# Patient Record
Sex: MALE | Race: WHITE | Employment: OTHER | URBAN - METROPOLITAN AREA
[De-identification: names, ages, dates, MRNs, and addresses within clinical notes are randomized per-mention and may not be internally consistent; named-entity substitution may affect disease eponyms.]

---

## 2017-01-03 ENCOUNTER — NURSE NAVIGATOR (OUTPATIENT)
Dept: INTERNAL MEDICINE CLINIC | Age: 82
End: 2017-01-03

## 2017-01-26 RX ORDER — CARVEDILOL 3.12 MG/1
TABLET ORAL
Qty: 90 TAB | Refills: 3 | Status: SHIPPED | OUTPATIENT
Start: 2017-01-26 | End: 2017-07-20 | Stop reason: SDUPTHER

## 2017-01-30 RX ORDER — HYDROCHLOROTHIAZIDE 12.5 MG/1
12.5 TABLET ORAL DAILY
Qty: 90 TAB | Refills: 1 | Status: SHIPPED | OUTPATIENT
Start: 2017-01-30 | End: 2017-07-24 | Stop reason: SDUPTHER

## 2017-03-13 RX ORDER — DONEPEZIL HYDROCHLORIDE 10 MG/1
10 TABLET, FILM COATED ORAL
Qty: 90 TAB | Refills: 3 | Status: SHIPPED | OUTPATIENT
Start: 2017-03-13 | End: 2018-05-30 | Stop reason: SDUPTHER

## 2017-04-24 RX ORDER — RAMIPRIL 2.5 MG/1
2.5 CAPSULE ORAL DAILY
Qty: 30 CAP | Refills: 11 | Status: SHIPPED | OUTPATIENT
Start: 2017-04-24 | End: 2017-11-03

## 2017-04-24 RX ORDER — CLOPIDOGREL BISULFATE 75 MG/1
75 TABLET ORAL DAILY
Qty: 30 TAB | Refills: 11 | Status: SHIPPED | OUTPATIENT
Start: 2017-04-24 | End: 2018-04-20 | Stop reason: SDUPTHER

## 2017-05-04 RX ORDER — MEMANTINE HYDROCHLORIDE 5 MG/1
5 TABLET ORAL DAILY
Qty: 30 TAB | Refills: 5 | Status: SHIPPED | OUTPATIENT
Start: 2017-05-04 | End: 2017-10-19 | Stop reason: SDUPTHER

## 2017-05-16 ENCOUNTER — DOCUMENTATION ONLY (OUTPATIENT)
Dept: INTERNAL MEDICINE CLINIC | Age: 82
End: 2017-05-16

## 2017-05-16 ENCOUNTER — HOSPITAL ENCOUNTER (OUTPATIENT)
Dept: LAB | Age: 82
Discharge: HOME | End: 2017-05-16
Payer: MEDICARE

## 2017-05-16 ENCOUNTER — TELEPHONE (OUTPATIENT)
Dept: INTERNAL MEDICINE CLINIC | Age: 82
End: 2017-05-16

## 2017-05-16 DIAGNOSIS — I10 ESSENTIAL HYPERTENSION: ICD-10-CM

## 2017-05-16 DIAGNOSIS — E78.5 HYPERLIPIDEMIA, UNSPECIFIED HYPERLIPIDEMIA TYPE: ICD-10-CM

## 2017-05-16 LAB
ALBUMIN SERPL BCP-MCNC: 3.6 G/DL (ref 3.4–5)
ALBUMIN/GLOB SERPL: 1.2 {RATIO} (ref 0.8–1.7)
ALP SERPL-CCNC: 69 U/L (ref 45–117)
ALT SERPL-CCNC: 33 U/L (ref 16–61)
ANION GAP BLD CALC-SCNC: 9 MMOL/L (ref 3–18)
AST SERPL W P-5'-P-CCNC: 25 U/L (ref 15–37)
BILIRUB SERPL-MCNC: 1.7 MG/DL (ref 0.2–1)
BUN SERPL-MCNC: 18 MG/DL (ref 7–18)
BUN/CREAT SERPL: 16 (ref 12–20)
CALCIUM SERPL-MCNC: 9 MG/DL (ref 8.5–10.1)
CHLORIDE SERPL-SCNC: 104 MMOL/L (ref 100–108)
CHOLEST SERPL-MCNC: 124 MG/DL
CO2 SERPL-SCNC: 28 MMOL/L (ref 21–32)
CREAT SERPL-MCNC: 1.13 MG/DL (ref 0.6–1.3)
GLOBULIN SER CALC-MCNC: 3 G/DL (ref 2–4)
GLUCOSE SERPL-MCNC: 101 MG/DL (ref 74–99)
HDLC SERPL-MCNC: 51 MG/DL (ref 40–60)
HDLC SERPL: 2.4 {RATIO} (ref 0–5)
LDLC SERPL CALC-MCNC: 53.8 MG/DL (ref 0–100)
LIPID PROFILE,FLP: NORMAL
POTASSIUM SERPL-SCNC: 3.5 MMOL/L (ref 3.5–5.5)
PROT SERPL-MCNC: 6.6 G/DL (ref 6.4–8.2)
SODIUM SERPL-SCNC: 141 MMOL/L (ref 136–145)
TRIGL SERPL-MCNC: 96 MG/DL (ref ?–150)
VLDLC SERPL CALC-MCNC: 19.2 MG/DL

## 2017-05-16 PROCEDURE — 36415 COLL VENOUS BLD VENIPUNCTURE: CPT | Performed by: INTERNAL MEDICINE

## 2017-05-16 PROCEDURE — 80061 LIPID PANEL: CPT | Performed by: INTERNAL MEDICINE

## 2017-05-16 PROCEDURE — 80053 COMPREHEN METABOLIC PANEL: CPT | Performed by: INTERNAL MEDICINE

## 2017-05-16 NOTE — TELEPHONE ENCOUNTER
Patient is here for labs today. Dropped off a current copy of his medications. It is in your box. Wife thought you may want to know about the changes in case it may affect the results.

## 2017-05-23 ENCOUNTER — OFFICE VISIT (OUTPATIENT)
Dept: INTERNAL MEDICINE CLINIC | Age: 82
End: 2017-05-23

## 2017-05-23 VITALS
DIASTOLIC BLOOD PRESSURE: 62 MMHG | WEIGHT: 149 LBS | SYSTOLIC BLOOD PRESSURE: 106 MMHG | TEMPERATURE: 97.4 F | HEIGHT: 66 IN | HEART RATE: 56 BPM | OXYGEN SATURATION: 98 % | BODY MASS INDEX: 23.95 KG/M2

## 2017-05-23 DIAGNOSIS — G30.0 EARLY ONSET ALZHEIMER'S DEMENTIA WITHOUT BEHAVIORAL DISTURBANCE (HCC): ICD-10-CM

## 2017-05-23 DIAGNOSIS — I10 ESSENTIAL HYPERTENSION: Primary | ICD-10-CM

## 2017-05-23 DIAGNOSIS — I25.10 ASHD (ARTERIOSCLEROTIC HEART DISEASE): ICD-10-CM

## 2017-05-23 DIAGNOSIS — E78.5 HYPERLIPIDEMIA, UNSPECIFIED HYPERLIPIDEMIA TYPE: ICD-10-CM

## 2017-05-23 DIAGNOSIS — R73.09 ABNORMAL GLUCOSE: ICD-10-CM

## 2017-05-23 DIAGNOSIS — I48.91 ATRIAL FIBRILLATION, UNSPECIFIED TYPE (HCC): ICD-10-CM

## 2017-05-23 DIAGNOSIS — F02.80 EARLY ONSET ALZHEIMER'S DEMENTIA WITHOUT BEHAVIORAL DISTURBANCE (HCC): ICD-10-CM

## 2017-05-23 NOTE — PROGRESS NOTES
1. Have you been to the ER, urgent care clinic or hospitalized since your last visit? NO.     2. Have you seen or consulted any other health care providers outside of the [address] [place] [address] since your last visit (Include any pap smears or colon screening)?  NO

## 2017-05-23 NOTE — MR AVS SNAPSHOT
Visit Information Date & Time Provider Department Dept. Phone Encounter #  
 5/23/2017 11:00 AM Suzette Jacobson MD Internist of [address] [place] [address] [phone] Follow-up Instructions Return in about 6 months (around 11/23/2017), or if symptoms worsen or fail to improve. Your Appointments 11/29/2017 11:00 AM  
Office Visit with Suzette Jacobson MD  
Internist of [address]) Appt Note: ov bs  
 [address] [facility], [facility] [place] [address] [facility] [place]  
  
   
 [address], [address] Upcoming Health Maintenance Date Due INFLUENZA AGE 9 TO ADULT 8/1/2017 MEDICARE YEARLY EXAM 11/18/2017 GLAUCOMA SCREENING Q2Y 11/28/2018 DTaP/Tdap/Td series (2 - Td) 11/20/2026 Allergies as of 5/23/2017  Review Complete On: 5/23/2017 By: Aileen Brothers Severity Noted Reaction Type Reactions Antihistamines - Alkylamine  07/07/2011    Unknown (comments) Sulfa (Sulfonamide Antibiotics)  07/07/2011    Unknown (comments) Current Immunizations  Reviewed on 11/10/2015 Name Date Influenza High Dose Vaccine PF 11/10/2015  2:39 PM, 10/6/2014 Influenza Vaccine Whole 12/30/2009 Pneumococcal Conjugate (PCV-13) 11/10/2015  2:41 PM  
 Pneumococcal Vaccine (Unspecified Type) 12/23/2005 TD Vaccine 12/16/2003 Tdap 11/10/2015  2:54 PM  
  
 Not reviewed this visit Vitals BP Pulse Temp Height(growth percentile) Weight(growth percentile) SpO2  
 106/62 (!) 56 97.4 °F (36.3 °C) (Oral) 5' 6\" (1.676 m) 149 lb (67.6 kg) 98% BMI Smoking Status 24.05 kg/m2 Former Smoker Vitals History BMI and BSA Data Body Mass Index Body Surface Area 24.05 kg/m 2 1.77 m 2 Preferred Pharmacy Pharmacy Name Phone [facility], [address] [place] [address] [phone] Your Updated Medication List  
  
   
 This list is accurate as of: 5/23/17 11:38 AM.  Always use your most recent med list.  
  
  
  
  
 aspirin 81 mg tablet Take 81 mg by mouth. atorvastatin 40 mg tablet Commonly known as:  LIPITOR  
TAKE ONE TABLET BY MOUTH ONE TIME DAILY  
  
 carvedilol 3.125 mg tablet Commonly known as:  COREG  
TAKE ONE TABLET BY MOUTH TWICE A DAY WITH MEALS  
  
 cetirizine 10 mg tablet Commonly known as:  ZYRTEC Take  by mouth daily. clopidogrel 75 mg Tab Commonly known as:  PLAVIX Take 1 Tab by mouth daily. donepezil 10 mg tablet Commonly known as:  ARICEPT Take 1 Tab by mouth nightly. FISH OIL 1,000 mg Cap Generic drug:  omega-3 fatty acids-vitamin e Take 1 Cap by mouth. fluticasone 50 mcg/actuation nasal spray Commonly known as:  Nataliya Henry 2 Sprays by Both Nostrils route daily. hydroCHLOROthiazide 12.5 mg tablet Commonly known as:  HYDRODIURIL Take 1 Tab by mouth daily. memantine 5 mg tablet Commonly known as:  Tommie Police Take 1 Tab by mouth daily. nitroglycerin 0.4 mg SL tablet Commonly known as:  NITROSTAT  
1 tab subling every 5 min for chest pain  Maximum 3 doses for any 1 episode  
  
 ramipril 2.5 mg capsule Commonly known as:  ALTACE Take 1 Cap by mouth daily. VITAMELTS ENERGY PO Take  by mouth. VITAMIN D3 1,000 unit Cap Generic drug:  cholecalciferol Take  by mouth.  
  
 vitamin E 400 unit capsule Commonly known as:  [address] Take  by mouth daily. Follow-up Instructions Return in about 6 months (around 11/23/2017), or if symptoms worsen or fail to improve. Patient Instructions Preventing Falls: Care Instructions Your Care Instructions Getting around your home safely can be a challenge if you have injuries or health problems that make it easy for you to fall.  Loose rugs and furniture in walkways are among the dangers for many older people who have problems walking or who have poor eyesight. People who have conditions such as arthritis, osteoporosis, or dementia also have to be careful not to fall. You can make your home safer with a few simple measures. Follow-up care is a key part of your treatment and safety. Be sure to make and go to all appointments, and call your doctor if you are having problems. It's also a good idea to know your test results and keep a list of the medicines you take. How can you care for yourself at home? Taking care of yourself · You may get dizzy if you do not drink enough water. To prevent dehydration, drink plenty of fluids, enough so that your urine is light yellow or clear like water. Choose water and other caffeine-free clear liquids. If you have kidney, heart, or liver disease and have to limit fluids, talk with your doctor before you increase the amount of fluids you drink. · Exercise regularly to improve your strength, muscle tone, and balance. Walk if you can. Swimming may be a good choice if you cannot walk easily. · Have your vision and hearing checked each year or any time you notice a change. If you have trouble seeing and hearing, you might not be able to avoid objects and could lose your balance. · Know the side effects of the medicines you take. Ask your doctor or pharmacist whether the medicines you take can affect your balance. Sleeping pills or sedatives can affect your balance. · Limit the amount of alcohol you drink. Alcohol can impair your balance and other senses. · Ask your doctor whether calluses or corns on your feet need to be removed. If you wear loose-fitting shoes because of calluses or corns, you can lose your balance and fall. · Talk to your doctor if you have numbness in your feet. Preventing falls at home · Remove raised doorway thresholds, throw rugs, and clutter. Repair loose carpet or raised areas in the floor. · Move furniture and electrical cords to keep them out of walking paths. · Use nonskid floor wax, and wipe up spills right away, especially on ceramic tile floors. · If you use a walker or cane, put rubber tips on it. If you use crutches, clean the bottoms of them regularly with an abrasive pad, such as steel wool. · Keep your house well lit, especially [place], and outside walkways. Use night-lights in areas such as hallways and bathrooms. Add extra light switches or use remote switches (such as switches that go on or off when you clap your hands) to make it easier to turn lights on if you have to get up during the night. · Install sturdy handrails on stairways. · Move items in your cabinets so that the things you use a lot are on the lower shelves (about waist level). · Keep a cordless phone and a flashlight with new batteries by your bed. If possible, put a phone in each of the main rooms of your house, or carry a cell phone in case you fall and cannot reach a phone. Or, you can wear a device around your neck or wrist. You push a button that sends a signal for help. · Wear low-heeled shoes that fit well and give your feet good support. Use footwear with nonskid soles. Check the heels and soles of your shoes for wear. Repair or replace worn heels or soles. · Do not wear socks without shoes on wood floors. · Walk on the grass when the sidewalks are slippery. If you live in an area that gets snow and ice in the winter, sprinkle salt on slippery steps and sidewalks. Preventing falls in the bath · Install grab bars and nonskid mats inside and outside your shower or tub and near the toilet and sinks. · Use shower chairs and bath benches. · Use a hand-held shower head that will allow you to sit while showering.  
· Get into a tub or shower by putting the weaker leg in first. Get out of a tub or shower with your strong side first. 
 · Repair loose toilet seats and consider installing a raised toilet seat to make getting on and off the toilet easier. · Keep your bathroom door unlocked while you are in the shower. Where can you learn more? Go to http://esteban-linh.info/. Enter 0476 [id number] 69 [id number] in the search box to learn more about \"Preventing Falls: Care Instructions. \" Current as of: August 4, 2016 Content Version: 11.2 © 6526-0474 [employer]. Care instructions adapted under license by [employer] (which disclaims liability or warranty for this information). If you have questions about a medical condition or this instruction, always ask your healthcare professional. [place] [address] any warranty or liability for your use of this information. Earwax Blockage: Care Instructions Your Care Instructions Earwax is a natural substance that protects the ear canal. Normally, earwax drains from the ears and does not cause problems. Sometimes earwax builds up and hardens. Earwax blockage (also called cerumen impaction) can cause some loss of hearing and pain. When wax is tightly packed, you will need to have your doctor remove it. Follow-up care is a key part of your treatment and safety. Be sure to make and go to all appointments, and call your doctor if you are having problems. Its also a good idea to know your test results and keep a list of the medicines you take. How can you care for yourself at home? · Do not try to remove earwax with cotton swabs, fingers, or other objects. This can make the blockage worse and damage the eardrum. · If your doctor recommends that you try to remove earwax at home: ¨ Soften and loosen the earwax with warm mineral oil. You also can try hydrogen peroxide mixed with an equal amount of room temperature water. Place 2 drops of the fluid, warmed to body temperature, in the ear two times a day for up to 5 days. ¨ Once the wax is loose and soft, all that is usually needed to remove it from the ear canal is a gentle, warm shower. Direct the water into the ear, then tip your head to let the earwax drain out. Dry your ear thoroughly with a hair dryer set on low. Hold the dryer several inches from your ear. ¨ If the warm mineral oil and shower do not work, use an over-the-counter wax softener followed by gentle flushing with an ear syringe each night for a week or two. Make sure the flushing solution is body temperature. Cool or hot fluids in the ear can cause dizziness. When should you call for help? Call your doctor now or seek immediate medical care if: · Pus or blood drains from your ear. · Your ears are ringing or feel full. · You have a loss of hearing. Watch closely for changes in your health, and be sure to contact your doctor if: 
· You have pain or reduced hearing after 1 week of home treatment. · You have any new symptoms, such as nausea or balance problems. Where can you learn more? Go to http://esteban-linh.info/. Enter D861 in the search box to learn more about \"Earwax Blockage: Care Instructions. \" Current as of: May 27, 2016 Content Version: 11.2 © 5625-1769 [employer]. Care instructions adapted under license by [employer] (which disclaims liability or warranty for this information). If you have questions about a medical condition or this instruction, always ask your healthcare professional. [place] [address] any warranty or liability for your use of this information. Introducing [facility]! Luis Miguel Tapia introduces [employer] patient portal. Now you can access parts of your medical record, email your doctor's office, and request medication refills online. 1. In your internet browser, go to [email or web address] 2. Click on the First Time User? Click Here link in the Sign In box.  You will see the New Member Sign Up page. 3. Enter your [employer] Access Code exactly as it appears below. You will not need to use this code after youve completed the sign-up process. If you do not sign up before the expiration date, you must request a new code. · [employer] Access Code: [email or web address] Expires: 8/21/2017 11:38 AM 
 
4. Enter the last four digits of your Social Security Number (xxxx) and Date of Birth (mm/dd/yyyy) as indicated and click Submit. You will be taken to the next sign-up page. 5. Create a [employer] ID. This will be your [employer] login ID and cannot be changed, so think of one that is secure and easy to remember. 6. Create a [employer] password. You can change your password at any time. 7. Enter your Password Reset Question and Answer. This can be used at a later time if you forget your password. 8. Enter your e-mail address. You will receive e-mail notification when new information is available in [address]. 9. Click Sign Up. You can now view and download portions of your medical record. 10. Click the Download Summary menu link to download a portable copy of your medical information. If you have questions, please visit the Frequently Asked Questions section of the [employer] website. Remember, [employer] Software is NOT to be used for urgent needs. For medical emergencies, dial 911. Now available from your iPhone and Android! Please provide this summary of care documentation to your next provider. Your primary care clinician is listed as Jairo Nguyen. If you have any questions after today's visit, please call [phone].

## 2017-05-23 NOTE — PATIENT INSTRUCTIONS
Preventing Falls: Care Instructions  Your Care Instructions  Getting around your home safely can be a challenge if you have injuries or health problems that make it easy for you to fall. Loose rugs and furniture in walkways are among the dangers for many older people who have problems walking or who have poor eyesight. People who have conditions such as arthritis, osteoporosis, or dementia also have to be careful not to fall. You can make your home safer with a few simple measures. Follow-up care is a key part of your treatment and safety. Be sure to make and go to all appointments, and call your doctor if you are having problems. It's also a good idea to know your test results and keep a list of the medicines you take. How can you care for yourself at home? Taking care of yourself  · You may get dizzy if you do not drink enough water. To prevent dehydration, drink plenty of fluids, enough so that your urine is light yellow or clear like water. Choose water and other caffeine-free clear liquids. If you have kidney, heart, or liver disease and have to limit fluids, talk with your doctor before you increase the amount of fluids you drink. · Exercise regularly to improve your strength, muscle tone, and balance. Walk if you can. Swimming may be a good choice if you cannot walk easily. · Have your vision and hearing checked each year or any time you notice a change. If you have trouble seeing and hearing, you might not be able to avoid objects and could lose your balance. · Know the side effects of the medicines you take. Ask your doctor or pharmacist whether the medicines you take can affect your balance. Sleeping pills or sedatives can affect your balance. · Limit the amount of alcohol you drink. Alcohol can impair your balance and other senses. · Ask your doctor whether calluses or corns on your feet need to be removed.  If you wear loose-fitting shoes because of calluses or corns, you can lose your balance and fall. · Talk to your doctor if you have numbness in your feet. Preventing falls at home  · Remove raised doorway thresholds, throw rugs, and clutter. Repair loose carpet or raised areas in the floor. · Move furniture and electrical cords to keep them out of walking paths. · Use nonskid floor wax, and wipe up spills right away, especially on ceramic tile floors. · If you use a walker or cane, put rubber tips on it. If you use crutches, clean the bottoms of them regularly with an abrasive pad, such as steel wool. · Keep your house well lit, especially Shefali Buys, and outside walkways. Use night-lights in areas such as hallways and bathrooms. Add extra light switches or use remote switches (such as switches that go on or off when you clap your hands) to make it easier to turn lights on if you have to get up during the night. · Install sturdy handrails on stairways. · Move items in your cabinets so that the things you use a lot are on the lower shelves (about waist level). · Keep a cordless phone and a flashlight with new batteries by your bed. If possible, put a phone in each of the main rooms of your house, or carry a cell phone in case you fall and cannot reach a phone. Or, you can wear a device around your neck or wrist. You push a button that sends a signal for help. · Wear low-heeled shoes that fit well and give your feet good support. Use footwear with nonskid soles. Check the heels and soles of your shoes for wear. Repair or replace worn heels or soles. · Do not wear socks without shoes on wood floors. · Walk on the grass when the sidewalks are slippery. If you live in an area that gets snow and ice in the winter, sprinkle salt on slippery steps and sidewalks. Preventing falls in the bath  · Install grab bars and nonskid mats inside and outside your shower or tub and near the toilet and sinks. · Use shower chairs and bath benches.   · Use a hand-held shower head that will allow you to sit while showering. · Get into a tub or shower by putting the weaker leg in first. Get out of a tub or shower with your strong side first.  · Repair loose toilet seats and consider installing a raised toilet seat to make getting on and off the toilet easier. · Keep your bathroom door unlocked while you are in the shower. Where can you learn more? Go to http://esteban-linh.info/. Enter 0476 [id number] in the search box to learn more about \"Preventing Falls: Care Instructions. \"  Current as of: August 4, 2016  Content Version: 11.2  © 1453-6658 [employer]. Care instructions adapted under license by [employer] (which disclaims liability or warranty for this information). If you have questions about a medical condition or this instruction, always ask your healthcare professional. [place] [address] any warranty or liability for your use of this information. Earwax Blockage: Care Instructions  Your Care Instructions    Earwax is a natural substance that protects the ear canal. Normally, earwax drains from the ears and does not cause problems. Sometimes earwax builds up and hardens. Earwax blockage (also called cerumen impaction) can cause some loss of hearing and pain. When wax is tightly packed, you will need to have your doctor remove it. Follow-up care is a key part of your treatment and safety. Be sure to make and go to all appointments, and call your doctor if you are having problems. Its also a good idea to know your test results and keep a list of the medicines you take. How can you care for yourself at home? · Do not try to remove earwax with cotton swabs, fingers, or other objects. This can make the blockage worse and damage the eardrum. · If your doctor recommends that you try to remove earwax at home:  ¨ Soften and loosen the earwax with warm mineral oil.  You also can try hydrogen peroxide mixed with an equal amount of room temperature water. Place 2 drops of the fluid, warmed to body temperature, in the ear two times a day for up to 5 days. ¨ Once the wax is loose and soft, all that is usually needed to remove it from the ear canal is a gentle, warm shower. Direct the water into the ear, then tip your head to let the earwax drain out. Dry your ear thoroughly with a hair dryer set on low. Hold the dryer several inches from your ear. ¨ If the warm mineral oil and shower do not work, use an over-the-counter wax softener followed by gentle flushing with an ear syringe each night for a week or two. Make sure the flushing solution is body temperature. Cool or hot fluids in the ear can cause dizziness. When should you call for help? Call your doctor now or seek immediate medical care if:  · Pus or blood drains from your ear. · Your ears are ringing or feel full. · You have a loss of hearing. Watch closely for changes in your health, and be sure to contact your doctor if:  · You have pain or reduced hearing after 1 week of home treatment. · You have any new symptoms, such as nausea or balance problems. Where can you learn more? Go to http://esteban-linh.info/. Enter O547 in the search box to learn more about \"Earwax Blockage: Care Instructions. \"  Current as of: May 27, 2016  Content Version: 11.2  © 0947-7181 [employer]. Care instructions adapted under license by [employer] (which disclaims liability or warranty for this information). If you have questions about a medical condition or this instruction, always ask your healthcare professional. [place] [address] any warranty or liability for your use of this information.

## 2017-05-27 PROBLEM — R73.09 ABNORMAL GLUCOSE: Status: ACTIVE | Noted: 2017-05-27

## 2017-05-27 PROBLEM — I48.91 ATRIAL FIBRILLATION (HCC): Status: ACTIVE | Noted: 2017-05-27

## 2017-05-27 NOTE — PROGRESS NOTES
HPI:   Tyler Elias is a 80y.o. year old male who presents today for evaluation of hypertension, hyperlipidemia, dementia due to Alzheimer's disease, ASCVD s/p PCI of LAD 2011 and h/o CVA, and h/o SVT. He is accompanied by his daughter who is his primary caregiver and provides most of the history. She reports that he is doing relatively well. She reports that he has remained fairly cooperative, without frequent outbursts. He continues to sleep well, and he continues to live at her home with caregivers daily from 9:30 am to 5pm while she is at work. She states that she leaves for work at 7:30 am, but she states that he does not get out of bed until 10 am, so she is comfortable not having caregivers at her home for the two hours after she leaves in the morning. He is no longer exercising on the stationary bicycle at home. His memory continues to slowly decline. He is ambulating with a cane and rollator, and she has installed a ramp for him to enter and leave the home. She expresses concern that his appetite and oral intake is decreasing, and he was started on Marinol by Dr. Rosa M Badillo in 4/2017. She is not sure that this is having a significant effect, however. At his last visit with Dr. Rosa M Badillo on 4/24/2017, he was found to have new onset rate-controlled atrial fibrillation. The decision was made not to proceed with anti-coagulation given his advanced dementia and fall risk. He has a history of dementia due to Alzheimer's disease that was first noticed in 1/2012 and progressed with significant worsening in 4/2014. 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 The patient was started on Aricept, with improvement noted in his mood and behavior.  In 9/2015, he was noted to have a jerking movement of his head to the right in the morning that lasted 30 seconds. There was no loss of consciousness, but the patient slept for several hours after the event. He was evaluated by Dr. Silver Lemon in 11/2015 for concerns that this may have been seizure activity. Evaluation included: EEG with mild generalized slowing consistent with dementia and no epileptiform activity. ESR/ T. Pallidum FTA-Ab/ folate/ SINDY/ ceruloplasmin/ copper/ TSH/ and B12 were all normal. Repeat MRI (11/2015) revealed a possiblly new subacute infarct in right periatrial white matter; otherwise unchanged from 2014. Carotid duplex scan (12/2015) showed mild (< 50%) stenosis bilaterally of the internal carotid arteries. According to the daughter, she was told by Dr. Silver Lemon that the episode from 9/2015 was most likely due to a small stroke. He has a history of hypertension and hyperlipidemia, and in 1/2012, developed an acute MI while driving home to [place] from [place]. He presented with acute coronary syndrome to [facility] (records unavailable) and reportedly underwent PCI and stent placement in the LAD. Left ventricular ejection fraction was reportedly depressed at 30% immediately post MI. He was treated with aspirin and clopidogrel. He reportedly did not have follow-up with Cardiology until 5/2014 when he was referred to see Dr. Debby Langley. Repeat echocardiogram in 10/2015 revealed mild concentric LVH with normal LV function (EF 65%) with mild MR and TR. He has remained asymptomatic and his regimen includes aspirin (81 mg), clopidogrel, carvedilol, ramipril, and hydrochlorothiazide, and high intensity dose atorvastatin. He also has a reported history of an episode of paroxysmal SVT in the chart, but details are unavailable. He is now being followed by Dr. Anita Méndez. He also has a history of retinal detachment and is being followed by Dr. Oracio Dukes.      Past Medical History:   Diagnosis Date    Acute coronary syndrome ([facility] [address] 12/26/2011 PCI [facility] LAD at [facility] [place].  Dementia due to Alzheimer's disease     Hepatitis B     about 60 years ago    Hyperlipidemia     Hypertension     Paroxysmal SVT (supraventricular tachycardia) (HCC)     Stroke [facility])     carotid artery right side     Past Surgical History:   Procedure Laterality Date    CARDIAC SURG PROCEDURE UNLIST      HX HEENT      tonsillectomy     Current Outpatient Prescriptions   Medication Sig    memantine (NAMENDA) 5 mg tablet Take 1 Tab by mouth daily.  clopidogrel (PLAVIX) 75 mg tab Take 1 Tab by mouth daily.  ramipril (ALTACE) 2.5 mg capsule Take 1 Cap by mouth daily.  donepezil (ARICEPT) 10 mg tablet Take 1 Tab by mouth nightly.  hydroCHLOROthiazide (HYDRODIURIL) 12.5 mg tablet Take 1 Tab by mouth daily.  carvedilol (COREG) 3.125 mg tablet TAKE ONE TABLET BY MOUTH TWICE A DAY WITH MEALS    nitroglycerin (NITROSTAT) 0.4 mg SL tablet 1 tab subling every 5 min for chest pain  Maximum 3 doses for any 1 episode    fluticasone (FLONASE) 50 mcg/actuation nasal spray 2 Sprays by Both Nostrils route daily.  atorvastatin (LIPITOR) 40 mg tablet TAKE ONE TABLET BY MOUTH ONE TIME DAILY    Cholecalciferol, Vitamin D3, (VITAMIN D3) 1,000 unit cap Take  by mouth.  CYANOCOBALAMIN, VITAMIN B-12, (VITAMELTS ENERGY PO) Take  by mouth.  vitamin E (AQUA GEMS) 400 unit capsule Take  by mouth daily.  cetirizine (ZYRTEC) 10 mg tablet Take  by mouth daily.  omega-3 fatty acids-vitamin e (FISH OIL) 1,000 mg Cap Take 1 Cap by mouth.  aspirin 81 mg tablet Take 81 mg by mouth. No current facility-administered medications for this visit. Allergies and Intolerances:    Allergies   Allergen Reactions    Antihistamines - Alkylamine Unknown (comments)    Sulfa (Sulfonamide Antibiotics) Unknown (comments)     Family History:   Family History   Problem Relation Age of Onset    Diabetes Sister     Stroke Sister      Social History:   He  reports that he has quit smoking. He does not have any smokeless tobacco history on file. He is . He lives with his daughter. He also has two sons. History   Alcohol Use No     Immunization History:  Immunization History   Administered Date(s) Administered    Influenza High Dose Vaccine PF 10/06/2014, 11/10/2015    Influenza Vaccine Whole 12/30/2009    Pneumococcal Conjugate (PCV-13) 11/10/2015    Pneumococcal Vaccine (Unspecified Type) 12/23/2005    TD Vaccine 12/16/2003    Tdap 11/10/2015       Review of Systems:   As above included in HPI. Otherwise 11 point review of systems negative including constitutional, skin, HENT, eyes, respiratory, cardiovascular, gastrointestinal, genitourinary, musculoskeletal, endo/heme/aller, neurological.    Physical:   Vitals:   BP: 106/62  HR: (!) 56  WT: 149 lb (67.6 kg)  BMI:  24.05 kg/m2    Exam:   Pt appears well; alert and oriented x 3; appropriate affect. HEENT: PERRLA, anicteric, cerumen in right ear canal, oropharynx clear, no JVD, adenopathy or thyromegaly. No carotid bruits or radiated murmur. Lungs: clear to auscultation, no wheezes, rhonchi, or rales. Heart: irregular rate and rhythm. No murmur, rubs, gallops  Abdomen: soft, nontender, nondistended, normal bowel sounds, no hepatosplenomegaly or masses. Extremities: without edema. Pulses 1-2+ bilaterally.     Review of Data:  Labs:  Hospital Outpatient Visit on 05/16/2017   Component Date Value Ref Range Status    LIPID PROFILE 05/16/2017        Final    Cholesterol, total 05/16/2017 124  <200 MG/DL Final    Triglyceride 05/16/2017 96  <150 MG/DL Final    HDL Cholesterol 05/16/2017 51  40 - 60 MG/DL Final    LDL, calculated 05/16/2017 53.8  0 - 100 MG/DL Final    VLDL, calculated 05/16/2017 19.2  MG/DL Final    CHOL/HDL Ratio 05/16/2017 2.4  0 - 5.0   Final    Sodium 05/16/2017 141  136 - 145 mmol/L Final    Potassium 05/16/2017 3.5  3.5 - 5.5 mmol/L Final    Chloride 05/16/2017 104  100 - 108 mmol/L Final    CO2 05/16/2017 28  21 - 32 mmol/L Final    Anion gap 05/16/2017 9  3.0 - 18 mmol/L Final    Glucose 05/16/2017 101* 74 - 99 mg/dL Final    BUN 05/16/2017 18  7.0 - 18 MG/DL Final    Creatinine 05/16/2017 1.13  0.6 - 1.3 MG/DL Final    BUN/Creatinine ratio 05/16/2017 16  12 - 20   Final    GFR est AA 05/16/2017 >60  >60 ml/min/1.73m2 Final    GFR est non-AA 05/16/2017 >60  >60 ml/min/1.73m2 Final    Calcium 05/16/2017 9.0  8.5 - 10.1 MG/DL Final    Bilirubin, total 05/16/2017 1.7* 0.2 - 1.0 MG/DL Final    ALT (SGPT) 05/16/2017 33  16 - 61 U/L Final    AST (SGOT) 05/16/2017 25  15 - 37 U/L Final    Alk. phosphatase 05/16/2017 69  45 - 117 U/L Final    Protein, total 05/16/2017 6.6  6.4 - 8.2 g/dL Final    Albumin 05/16/2017 3.6  3.4 - 5.0 g/dL Final    Globulin 05/16/2017 3.0  2.0 - 4.0 g/dL Final    A-G Ratio 05/16/2017 1.2  0.8 - 1.7   Final     Health Maintenance:  Screening:    Colorectal: colonoscopy (2003) normal. No further screening needed. Depression: none   DM (HbA1c/FPG):  (5/2017)   Hepatitis C: N/A   Falls: No recent falls. Using rollator and cane. DEXA: N/A   PSA/PLACIDO: No further screening needed. Glaucoma: regular eye exams with Dr. Cheng Hagen (last 11/2016)   Smoking: none   Vitamin D: 45.3 (11/2016)   Medicare Wellness: 11/17/2016    Impression:  Patient Active Problem List   Diagnosis Code    SVT (supraventricular tachycardia) (HCC) I47.1    Hyperlipidemia E78.5    ASHD (arteriosclerotic heart disease)LAD stent 2011 I25.10    Neurogenic bladder N31.9    Alzheimer's disease G30.9    Dysphagia R13.10    Essential hypertension I10    Inguinal hernia unilateral, non-recurrent K40.90    PAD (peripheral artery disease) ([facility]) I73.9    Allergic contact dermatitis L23.9    Atrial fibrillation ([facility]) I48.91    Abnormal glucose R73.09       Plan:  1. Hypertension. Blood pressure somewhat low today on current regimen of carvedilol, ramipril, and hydrochlorothiazide. Appears asymptomatic, but given decrease in po intake, will discontinue hydrochlorothiazide and monitor blood pressure daily. Renal function remains normal with creatinine 1.13 / eGFR >60. Continue to follow. 2. Alzheimer's disease. Slowly progressive. On Aricept and Namenda. Started on Marinol for decrease in appetite, although unclear if having beneficial effect. Now with essentially 24 hour care except for two hours in the morning while he is asleep. Discussed need for constant supervision given concern that sleeping habits may change. Willing to increase services if needed. Fall precautions stressed. Follow. 3. Prior \"seizure-like\" episode. Felt to be due to small stroke. On clopidogrel and aspirin. No recurrence. Follow. 4. ASHD. Currently asymptomatic and stable on current regimen. LV function recovered after initially stunning post-MI. On aspirin, clopidogrel, carvedilol, and statin. Being followed by Dr. Bill Montoya. .   5. Atrial fibrillation. Rate controlled on carvedilol. Opting not to proceed with long term anticoagulation ([id number] = 5) given fall risk and advanced dementia. On aspirin and Plavix. 6. Hyperlipidemia. On high intensity dose atorvastatin with LDL 53.8, indicative of excellent control. Continue to follow. 7. Abnormal glucose. Mildly elevated today and has been so intermittently in the past. Will check HbA1c at next blood draw. 8. Health maintenance. Discussed Zoster vaccine and daughter is not clear if he ever received it. He did have a mild case of shingles in 2014. She would prefer not to administer since she believes he may have had it previously. Other immunizations up to date. No further colorectal or prostate screening. Vitamin D level normal. Fall precautions discussed. Continue regular eye exams with Dr. Michelle Nava.     Total time: 40 minutes spent with the patient in face-to-face consultation of which greater than 50% was spent on counseling, answering questions and/or coordination of care. Complex medical review and management performed. Patient understands recommendations and agrees with plan. Follow-up in 6 months.

## 2017-07-20 RX ORDER — CARVEDILOL 3.12 MG/1
TABLET ORAL
Qty: 90 TAB | Refills: 3 | Status: SHIPPED | OUTPATIENT
Start: 2017-07-20 | End: 2017-12-26

## 2017-07-20 RX ORDER — ATORVASTATIN CALCIUM 40 MG/1
TABLET, FILM COATED ORAL
Qty: 90 TAB | Refills: 3 | Status: SHIPPED | OUTPATIENT
Start: 2017-07-20 | End: 2018-07-16 | Stop reason: SDUPTHER

## 2017-07-25 RX ORDER — HYDROCHLOROTHIAZIDE 12.5 MG/1
12.5 TABLET ORAL DAILY
Qty: 90 TAB | Refills: 1 | Status: SHIPPED | OUTPATIENT
Start: 2017-07-25 | End: 2017-11-03

## 2017-08-22 DIAGNOSIS — Z77.120 MOLD EXPOSURE: ICD-10-CM

## 2017-08-22 RX ORDER — FLUTICASONE PROPIONATE 50 MCG
SPRAY, SUSPENSION (ML) NASAL
Qty: 1 BOTTLE | Refills: 5 | Status: SHIPPED | OUTPATIENT
Start: 2017-08-22 | End: 2017-09-25 | Stop reason: SDUPTHER

## 2017-09-25 DIAGNOSIS — Z77.120 MOLD EXPOSURE: ICD-10-CM

## 2017-09-25 RX ORDER — FLUTICASONE PROPIONATE 50 MCG
2 SPRAY, SUSPENSION (ML) NASAL DAILY
Qty: 1 BOTTLE | Refills: 6 | Status: SHIPPED | OUTPATIENT
Start: 2017-09-25 | End: 2017-09-26 | Stop reason: SDUPTHER

## 2017-09-26 DIAGNOSIS — Z77.120 MOLD EXPOSURE: ICD-10-CM

## 2017-09-26 RX ORDER — FLUTICASONE PROPIONATE 50 MCG
2 SPRAY, SUSPENSION (ML) NASAL DAILY
Qty: 3 BOTTLE | Refills: 3 | Status: SHIPPED | OUTPATIENT
Start: 2017-09-26 | End: 2018-02-09 | Stop reason: ALTCHOICE

## 2017-10-19 RX ORDER — MEMANTINE HYDROCHLORIDE 5 MG/1
TABLET ORAL
Qty: 30 TAB | Refills: 5 | Status: SHIPPED | OUTPATIENT
Start: 2017-10-19 | End: 2018-04-26 | Stop reason: SDUPTHER

## 2017-11-03 ENCOUNTER — TELEPHONE (OUTPATIENT)
Dept: INTERNAL MEDICINE CLINIC | Age: 82
End: 2017-11-03

## 2017-11-03 NOTE — TELEPHONE ENCOUNTER
Daughter called-  On Tiffanie- not wanting to eat much for past couple of days- losing weight- BP is 78/60 pulse 83. Her concern is this normal?  Doesn't want to drink- he's crabby.  Please advise

## 2017-11-03 NOTE — TELEPHONE ENCOUNTER
Called and spoke with patient's daughter. Reports that has not been eating or drinking well over past few days, and noted blood pressure 78/60 earlier. Repeat now 85/68. No longer taking hydrochlorothiazide or ramipril. Still taking Coreg 3.125 mg BID and took it this morning. No fevers, chills, cough, vomiting, abdominal pain, or urinary complaints. Instructed to attempt to push fluids. Instructed to hold Coreg for SBP <100. Continue to monitor blood pressure. If remains low, recommended ED evaluation. Discussed hospice referral. Patient with Alzheimer's disease which has been slowly progressive. She stated that she will keep it in mind as a future option.

## 2017-12-14 ENCOUNTER — TELEPHONE (OUTPATIENT)
Dept: INTERNAL MEDICINE CLINIC | Age: 82
End: 2017-12-14

## 2017-12-14 ENCOUNTER — HOSPITAL ENCOUNTER (OUTPATIENT)
Dept: GENERAL RADIOLOGY | Age: 82
Discharge: HOME | End: 2017-12-14
Payer: MEDICARE

## 2017-12-14 ENCOUNTER — HOSPITAL ENCOUNTER (OUTPATIENT)
Dept: LAB | Age: 82
Discharge: HOME | End: 2017-12-14
Payer: MEDICARE

## 2017-12-14 ENCOUNTER — OFFICE VISIT (OUTPATIENT)
Dept: INTERNAL MEDICINE CLINIC | Age: 82
End: 2017-12-14

## 2017-12-14 VITALS
TEMPERATURE: 97.7 F | WEIGHT: 134.2 LBS | SYSTOLIC BLOOD PRESSURE: 116 MMHG | OXYGEN SATURATION: 96 % | HEIGHT: 66 IN | DIASTOLIC BLOOD PRESSURE: 70 MMHG | HEART RATE: 78 BPM | RESPIRATION RATE: 14 BRPM | BODY MASS INDEX: 21.57 KG/M2

## 2017-12-14 DIAGNOSIS — R53.83 FATIGUE, UNSPECIFIED TYPE: ICD-10-CM

## 2017-12-14 DIAGNOSIS — R73.09 ABNORMAL GLUCOSE: ICD-10-CM

## 2017-12-14 DIAGNOSIS — R05.9 COUGH: Primary | ICD-10-CM

## 2017-12-14 DIAGNOSIS — R05.9 COUGH: ICD-10-CM

## 2017-12-14 DIAGNOSIS — R26.81 UNSTEADY GAIT: ICD-10-CM

## 2017-12-14 DIAGNOSIS — I10 ESSENTIAL HYPERTENSION: ICD-10-CM

## 2017-12-14 LAB
ALBUMIN SERPL-MCNC: 3.5 G/DL (ref 3.4–5)
ALBUMIN/GLOB SERPL: 1.1 {RATIO} (ref 0.8–1.7)
ALP SERPL-CCNC: 72 U/L (ref 45–117)
ALT SERPL-CCNC: 23 U/L (ref 16–61)
ANION GAP SERPL CALC-SCNC: 11 MMOL/L (ref 3–18)
AST SERPL-CCNC: 18 U/L (ref 15–37)
BASOPHILS # BLD: 0 K/UL (ref 0–0.06)
BASOPHILS NFR BLD: 0 % (ref 0–2)
BILIRUB SERPL-MCNC: 2.4 MG/DL (ref 0.2–1)
BUN SERPL-MCNC: 14 MG/DL (ref 7–18)
BUN/CREAT SERPL: 12 (ref 12–20)
CALCIUM SERPL-MCNC: 8.4 MG/DL (ref 8.5–10.1)
CHLORIDE SERPL-SCNC: 106 MMOL/L (ref 100–108)
CO2 SERPL-SCNC: 24 MMOL/L (ref 21–32)
CREAT SERPL-MCNC: 1.21 MG/DL (ref 0.6–1.3)
DIFFERENTIAL METHOD BLD: ABNORMAL
EOSINOPHIL # BLD: 0 K/UL (ref 0–0.4)
EOSINOPHIL NFR BLD: 0 % (ref 0–5)
ERYTHROCYTE [DISTWIDTH] IN BLOOD BY AUTOMATED COUNT: 13.8 % (ref 11.6–14.5)
EST. AVERAGE GLUCOSE BLD GHB EST-MCNC: 108 MG/DL
GLOBULIN SER CALC-MCNC: 3.2 G/DL (ref 2–4)
GLUCOSE SERPL-MCNC: 198 MG/DL (ref 74–99)
HBA1C MFR BLD: 5.4 % (ref 4.2–5.6)
HCT VFR BLD AUTO: 46.8 % (ref 36–48)
HGB BLD-MCNC: 15.5 G/DL (ref 13–16)
LYMPHOCYTES # BLD: 0.6 K/UL (ref 0.9–3.6)
LYMPHOCYTES NFR BLD: 7 % (ref 21–52)
MCH RBC QN AUTO: 31.2 PG (ref 24–34)
MCHC RBC AUTO-ENTMCNC: 33.1 G/DL (ref 31–37)
MCV RBC AUTO: 94.2 FL (ref 74–97)
MONOCYTES # BLD: 0.5 K/UL (ref 0.05–1.2)
MONOCYTES NFR BLD: 6 % (ref 3–10)
NEUTS SEG # BLD: 7.4 K/UL (ref 1.8–8)
NEUTS SEG NFR BLD: 87 % (ref 40–73)
PLATELET # BLD AUTO: 159 K/UL (ref 135–420)
PMV BLD AUTO: 10.8 FL (ref 9.2–11.8)
POTASSIUM SERPL-SCNC: 3.4 MMOL/L (ref 3.5–5.5)
PROT SERPL-MCNC: 6.7 G/DL (ref 6.4–8.2)
RBC # BLD AUTO: 4.97 M/UL (ref 4.7–5.5)
SODIUM SERPL-SCNC: 141 MMOL/L (ref 136–145)
TSH SERPL DL<=0.05 MIU/L-ACNC: 1.31 UIU/ML (ref 0.36–3.74)
WBC # BLD AUTO: 8.5 K/UL (ref 4.6–13.2)

## 2017-12-14 PROCEDURE — 80053 COMPREHEN METABOLIC PANEL: CPT | Performed by: INTERNAL MEDICINE

## 2017-12-14 PROCEDURE — 84443 ASSAY THYROID STIM HORMONE: CPT | Performed by: INTERNAL MEDICINE

## 2017-12-14 PROCEDURE — 71020 XR CHEST PA LAT: CPT

## 2017-12-14 PROCEDURE — 36415 COLL VENOUS BLD VENIPUNCTURE: CPT | Performed by: INTERNAL MEDICINE

## 2017-12-14 PROCEDURE — 85025 COMPLETE CBC W/AUTO DIFF WBC: CPT | Performed by: INTERNAL MEDICINE

## 2017-12-14 PROCEDURE — 83036 HEMOGLOBIN GLYCOSYLATED A1C: CPT | Performed by: INTERNAL MEDICINE

## 2017-12-14 RX ORDER — CHOLECALCIFEROL (VITAMIN D3) 125 MCG
200 CAPSULE ORAL DAILY
COMMUNITY
End: 2019-01-01

## 2017-12-14 RX ORDER — DOXYCYCLINE 100 MG/1
100 TABLET ORAL 2 TIMES DAILY
Qty: 20 TAB | Refills: 0 | Status: SHIPPED | OUTPATIENT
Start: 2017-12-14 | End: 2017-12-24

## 2017-12-14 NOTE — TELEPHONE ENCOUNTER
Pt has return appt with Dr. Brynn Rees end of January. Daughter asking if he needs to be seen sooner or are you waiting for the xray results?

## 2017-12-14 NOTE — MR AVS SNAPSHOT
Visit Information Date & Time Provider Department Dept. Phone Encounter #  
 12/14/2017 11:00 AM Stefania Major NP [facility] [address] [place] [address] [phone] [id number] Your Appointments 1/18/2018  9:35 AM  
LAB with C NURSE VISIT [facility] [address] [place] [address] ([address] [place] [address]) Appt Note: lab; PT R/S FROM 11/22/17  
 [address], [facility] [id number] [place] [address] [address] [place]  
  
   
 [address], [address]  
  
    
 1/25/2018  9:00 AM  
Office Visit with Parker Sharpe MD  
[facility] [address] Appt Note: ov bs; PT R/S FROM 11/29/17  
 [address] [facility] [address] [address] [place]  
  
   
 [address], [address] Upcoming Health Maintenance Date Due  
 MEDICARE YEARLY EXAM 11/18/2017 GLAUCOMA SCREENING Q2Y 11/28/2018 DTaP/Tdap/Td series (2 - Td) 11/20/2026 Allergies as of 12/14/2017  Review Complete On: 12/14/2017 By: Jerod Mcarthur Severity Noted Reaction Type Reactions Antihistamines - Alkylamine  07/07/2011    Unknown (comments) Sulfa (Sulfonamide Antibiotics)  07/07/2011    Unknown (comments) Current Immunizations  Reviewed on 11/10/2015 Name Date Influenza High Dose Vaccine PF 9/23/2017, 11/10/2015  2:39 PM, 10/6/2014 Influenza Vaccine Whole 12/30/2009 Pneumococcal Conjugate (PCV-13) 11/10/2015  2:41 PM  
 TD Vaccine 12/16/2003 Tdap 11/10/2015  2:54 PM  
 ZZZ-RETIRED (DO NOT USE) Pneumococcal Vaccine (Unspecified Type) 12/23/2005 Not reviewed this visit You Were Diagnosed With   
  
 Codes Comments Cough    -  Primary ICD-10-CM: D32 ICD-9-CM: 786.2 Fatigue, unspecified type     ICD-10-CM: R53.83 ICD-9-CM: 780.79 Unsteady gait     ICD-10-CM: R26.81 
ICD-9-CM: 901. 2 Vitals BP Pulse Temp Resp Height(growth percentile) Weight(growth percentile) 116/70 (BP 1 Location: Left arm, BP Patient Position: Sitting) 78 97.7 °F (36.5 °C) (Oral) 14 5' 6\" (1.676 m) 134 lb 3.2 oz (60.9 kg) SpO2 BMI Smoking Status 96% 21.66 kg/m2 Former Smoker Vitals History BMI and BSA Data Body Mass Index Body Surface Area  
 21.66 kg/m 2 1.68 m 2 Preferred Pharmacy Pharmacy Name Phone [facility], [address] [place] [address] [phone] Your Updated Medication List  
  
   
This list is accurate as of: 12/14/17 11:56 AM.  Always use your most recent med list.  
  
  
  
  
 aspirin 81 mg tablet Take 81 mg by mouth. atorvastatin 40 mg tablet Commonly known as:  LIPITOR  
TAKE ONE TABLET BY MOUTH ONE TIME DAILY  
  
 carvedilol 3.125 mg tablet Commonly known as:  COREG  
TAKE ONE TABLET BY MOUTH TWICE A DAY WITH MEALS  
  
 cetirizine 10 mg tablet Commonly known as:  ZYRTEC Take  by mouth daily. clopidogrel 75 mg Tab Commonly known as:  PLAVIX Take 1 Tab by mouth daily. CO Q-10 100 mg capsule Generic drug:  co-enzyme Q-10 Take 200 mg by mouth daily. donepezil 10 mg tablet Commonly known as:  ARICEPT Take 1 Tab by mouth nightly. EMERGEN-C PO Take  by mouth. FISH OIL 1,000 mg Cap Generic drug:  omega-3 fatty acids-vitamin e Take 1 Cap by mouth. fluticasone 50 mcg/actuation nasal spray Commonly known as:  Corin Serene 2 Sprays by Both Nostrils route daily. memantine 5 mg tablet Commonly known as:  Shay Sorrow TAKE 1 TAB BY MOUTH DAILY. nitroglycerin 0.4 mg SL tablet Commonly known as:  NITROSTAT  
1 tab subling every 5 min for chest pain  Maximum 3 doses for any 1 episode VITAMELTS ENERGY PO Take  by mouth. VITAMIN D3 1,000 unit Cap Generic drug:  cholecalciferol Take  by mouth.  
  
 vitamin E 400 unit capsule Commonly known as:  [address] Take  by mouth daily. To-Do List   
 12/14/2017 Imaging:  XR CHEST PA LAT Introducing [facility]! Miguel Joann introduces [employer] patient portal. Now you can access parts of your medical record, email your doctor's office, and request medication refills online. 1. In your internet browser, go to [email or web address] 2. Click on the First Time User? Click Here link in the Sign In box. You will see the New Member Sign Up page. 3. Enter your [employer] Access Code exactly as it appears below. You will not need to use this code after youve completed the sign-up process. If you do not sign up before the expiration date, you must request a new code. · [employer] Access Code: [email or web address] Expires: 3/14/2018 10:50 AM 
 
4. Enter the last four digits of your Social Security Number (xxxx) and Date of Birth (mm/dd/yyyy) as indicated and click Submit. You will be taken to the next sign-up page. 5. Create a [employer] ID. This will be your [employer] login ID and cannot be changed, so think of one that is secure and easy to remember. 6. Create a [employer] password. You can change your password at any time. 7. Enter your Password Reset Question and Answer. This can be used at a later time if you forget your password. 8. Enter your e-mail address. You will receive e-mail notification when new information is available in [address] 9. Click Sign Up. You can now view and download portions of your medical record. 10. Click the Download Summary menu link to download a portable copy of your medical information. If you have questions, please visit the Frequently Asked Questions section of the [employer] website. Remember, [employer] is NOT to be used for urgent needs. For medical emergencies, dial 911. Now available from your iPhone and Android! Please provide this summary of care documentation to your next provider. Your primary care clinician is listed as Peter Jarrell.  If you have any questions after today's visit, please call [phone].

## 2017-12-14 NOTE — PROGRESS NOTES
Miranda Bryan is a 80 y.o.  male and presents with    Chief Complaint   Patient presents with    Cold Symptoms     Patient here for cough with white mucus, runny nose. x 2 days        Subjective:  HPI   Mr. Maya Joaquin presents today with complaints of cold like symptoms that started about 2 days ago. He presents with the caregiver and his daughter, Talisha Aguirre. Talisha Aguirre reports she was recently diagnosed with similar symptoms and diagnosed with allergic bronchitis with response to prednisone. They are in the middle of remodeling their house. The patient has a productive cough with white sputum that is worse with laying down, runny nose, chest congestion, and increased fatigue. Talisha Aguirre reports he is refusing to prop head up while sleeping, been laying flat. Has not tried OTC therapy but is taking Flonase daily. Denies fever, chills, sweats, hemoptysis, sore throat, ear pain, headaches, sinus congestion, wheezing, dsypnea. Talisha Aguirre states she gave the patient one of her Lasix tabs last night to help get rid of the fluid. Additional Concerns: none     ROS   Review of Systems   Constitutional: Positive for malaise/fatigue. Negative for chills, diaphoresis and fever. HENT: Positive for congestion. Negative for sinus pain and sore throat. Respiratory: Positive for cough and sputum production. Negative for hemoptysis, shortness of breath and wheezing. Cardiovascular: Negative for chest pain, palpitations and leg swelling. Gastrointestinal: Negative for abdominal pain, blood in stool, constipation, diarrhea, melena, nausea and vomiting. Genitourinary: Negative for hematuria. Musculoskeletal: Negative for myalgias. Skin: Negative for rash. Neurological: Positive for weakness. Negative for dizziness and headaches. Uses a walking stick with steady gait, however today increased weakness and unsteadiness per Daughterroxy.         Allergies   Allergen Reactions    Antihistamines - Alkylamine Unknown (comments)    Sulfa (Sulfonamide Antibiotics) Unknown (comments)       Current Outpatient Prescriptions   Medication Sig Dispense Refill    co-enzyme Q-10 (CO Q-10) 100 mg capsule Take 200 mg by mouth daily.  ASCORBIC ACID/MULTIVIT-MIN (EMERGEN-C PO) Take  by mouth.  memantine (NAMENDA) 5 mg tablet TAKE 1 TAB BY MOUTH DAILY. 30 Tab 5    fluticasone (FLONASE) 50 mcg/actuation nasal spray 2 Sprays by Both Nostrils route daily. 3 Bottle 3    atorvastatin (LIPITOR) 40 mg tablet TAKE ONE TABLET BY MOUTH ONE TIME DAILY 90 Tab 3    clopidogrel (PLAVIX) 75 mg tab Take 1 Tab by mouth daily. 30 Tab 11    donepezil (ARICEPT) 10 mg tablet Take 1 Tab by mouth nightly. 90 Tab 3    nitroglycerin (NITROSTAT) 0.4 mg SL tablet 1 tab subling every 5 min for chest pain  Maximum 3 doses for any 1 episode 25 Tab 5    Cholecalciferol, Vitamin D3, (VITAMIN D3) 1,000 unit cap Take  by mouth.  CYANOCOBALAMIN, VITAMIN B-12, (VITAMELTS ENERGY PO) Take  by mouth.  omega-3 fatty acids-vitamin e (FISH OIL) 1,000 mg Cap Take 1 Cap by mouth.  aspirin 81 mg tablet Take 81 mg by mouth.  carvedilol (COREG) 3.125 mg tablet TAKE ONE TABLET BY MOUTH TWICE A DAY WITH MEALS 90 Tab 3    vitamin E (AQUA GEMS) 400 unit capsule Take  by mouth daily.  cetirizine (ZYRTEC) 10 mg tablet Take  by mouth daily. Social History     Social History    Marital status:      Spouse name: N/A    Number of children: N/A    Years of education: N/A     Occupational History    Not on file. Social History Main Topics    Smoking status: Former Smoker    Smokeless tobacco: Never Used    Alcohol use No    Drug use: No    Sexual activity: No     Other Topics Concern    Not on file     Social History Narrative       Past Medical History:   Diagnosis Date    Acute coronary syndrome ([address] 12/26/2011    PCI of LAD at [facility] in [place].    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       Past Surgical History:   Procedure Laterality Date    CARDIAC SURG PROCEDURE UNLIST      HX HEENT      tonsillectomy       Family History   Problem Relation Age of Onset    Diabetes Sister     Stroke Sister        Objective:  Vitals:    12/14/17 1059   BP: 116/70   Pulse: 78   Resp: 14   Temp: 97.7 °F (36.5 °C)   TempSrc: Oral   SpO2: 96%   Weight: 134 lb 3.2 oz (60.9 kg)   Height: 5' 6\" (1.676 m)   PainSc:   0 - No pain       LABS   Results for orders placed or performed during the hospital encounter of 05/16/17   LIPID PANEL   Result Value Ref Range    LIPID PROFILE          Cholesterol, total 124 <200 MG/DL    Triglyceride 96 <150 MG/DL    HDL Cholesterol 51 40 - 60 MG/DL    LDL, calculated 53.8 0 - 100 MG/DL    VLDL, calculated 19.2 MG/DL    CHOL/HDL Ratio 2.4 0 - 5.0     METABOLIC PANEL, COMPREHENSIVE   Result Value Ref Range    Sodium 141 136 - 145 mmol/L    Potassium 3.5 3.5 - 5.5 mmol/L    Chloride 104 100 - 108 mmol/L    CO2 28 21 - 32 mmol/L    Anion gap 9 3.0 - 18 mmol/L    Glucose 101 (H) 74 - 99 mg/dL    BUN 18 7.0 - 18 MG/DL    Creatinine 1.13 0.6 - 1.3 MG/DL    BUN/Creatinine ratio 16 12 - 20      GFR est AA >60 >60 ml/min/1.73m2    GFR est non-AA >60 >60 ml/min/1.73m2    Calcium 9.0 8.5 - 10.1 MG/DL    Bilirubin, total 1.7 (H) 0.2 - 1.0 MG/DL    ALT (SGPT) 33 16 - 61 U/L    AST (SGOT) 25 15 - 37 U/L    Alk. phosphatase 69 45 - 117 U/L    Protein, total 6.6 6.4 - 8.2 g/dL    Albumin 3.6 3.4 - 5.0 g/dL    Globulin 3.0 2.0 - 4.0 g/dL    A-G Ratio 1.2 0.8 - 1.7         TESTS  none    PE  Physical Exam   Constitutional: He appears well-developed and well-nourished. HENT:   Head: Normocephalic and atraumatic. Left Ear: External ear normal.   Nose: Nose normal.   Mouth/Throat: No oropharyngeal exudate.    Right ear with cerumen impaction, Jose Rafael Carcamo reports scheduled ENT appointment with Dr. Domenica Urias    Oropharynx erythema with thick sputum noted. Eyes: Conjunctivae and EOM are normal. Pupils are equal, round, and reactive to light. Neck: Normal range of motion. Cardiovascular: Normal heart sounds and intact distal pulses. Chronic afib history   Pulmonary/Chest: Effort normal. No respiratory distress. He has wheezes. He has no rales. He exhibits no tenderness. Abdominal: Soft. Bowel sounds are normal.   Lymphadenopathy:     He has no cervical adenopathy. Skin: Skin is warm and dry. No rash noted. No erythema. No pallor. Psychiatric: He has a normal mood and affect. His behavior is normal.   Unable to assess thought, judgment, patient is cooperative       Assessment/Plan:    1. Cough and fatigue- Chest xray ordered r/o pneumonia, aspiration. History of dysphagia that is reportedly worsened per [facility]. Will call with results and discuss further therapy if needed. Labs today. Continue Flonase. Unable to tolerate taking antihistamine. CRB-65- of 1, history of alzheimer, vital signs stable, RR 14, POX 96%. Sent in script for nebulizer machine to eliminate possible poor administration due to alzheimer disease. 2. Unsteady gait- DME script given to Aislinn Shaikh for wheelchair. 3. Daughter with multiple questions and concerns- None emergent, recommended to make an appointment prior to leaving office with Dr. Anthony TURNER [address] [place] [address], PCP to discuss. Lab review: orders written for new lab studies as appropriate; see orders    Today's Visit:   Diagnoses and all orders for this visit:    1. Cough  -     XR CHEST PA LAT; Future    2. Fatigue, unspecified type  -     XR CHEST PA LAT; Future    3. Unsteady gait        Health Maintenance: Not addressed today. I have discussed the diagnosis with the patient and the intended plan as seen in the above orders. The patient has received an after-visit summary and questions were answered concerning future plans.   I have discussed medication side effects and warnings with the patient as well. I have reviewed the plan of care with the patient, accepted their input and they are in agreement with the treatment goals. Follow-up Disposition: Not on File   More than 1/2 of this 25 minute visit was spent in counseling and coordination of care, as described above.     CHARISSA Grimes  Internist of [address], [address] [place] [address] Str.  Phone: [phone]  Fax: [phone]

## 2017-12-14 NOTE — TELEPHONE ENCOUNTER
The Chest xray can not rule out pneumonia as it is showing opacity to both lower lungs. I recommend an antibiotic at this time. I am going to prescribe Doxycycline. Can you please schedule him for a sooner appointment with Dr. Tu Stern than already scheduled, as Lucia Kaye the daughter had multiple concerns and questions. Please follow up for re-evaluation if symptoms persist or worsen.

## 2017-12-14 NOTE — TELEPHONE ENCOUNTER
I would feel better if he was seen sooner. I spoke with Dr. García Cannon and she said see if there is a sooner opening. The daughter had multiple questions and concerns on visit today that I deferred back to the PCP.

## 2017-12-14 NOTE — PROGRESS NOTES
1. Have you been to the ER, urgent care clinic or hospitalized since your last visit? NO.     2. Have you seen or consulted any other health care providers outside of the [address] [place] [address] since your last visit (Include any pap smears or colon screening)? NO      Do you have an Advanced Directive?  YES

## 2017-12-15 ENCOUNTER — TELEPHONE (OUTPATIENT)
Dept: INTERNAL MEDICINE CLINIC | Age: 82
End: 2017-12-15

## 2017-12-15 NOTE — TELEPHONE ENCOUNTER
I left a lengthy message on Nadira's cell phone per her request during our office visit. Discussed need for antibiotic, provided impression of chest xray, and to have Mr. Crews Needtressa re-evaluated sooner if symptoms not resolving. However also recommended sooner evaluation with Dr. Ruby Alegre regarding chronic issues to be scheduled sooner than the end of January as she had multiple concerns during the acute visit.

## 2017-12-15 NOTE — TELEPHONE ENCOUNTER
I spoke with Jermaine Gonzalez regarding chest xray results. She reports he has increased yellow sputum production however is eating well. Having increased confusion. She started the Doxycycline last night. I sent in an order for Albuterol with Nebulizer and recommended she keep the head of bed elevated to prevent possible aspiration, if this is occurring. Also had  reschedule patient for a sooner appointment with Dr. Dima Winston to address other concerns.

## 2017-12-15 NOTE — TELEPHONE ENCOUNTER
LM with patients daughter, Aimee Berman to give us a call back.  The patients caretaker answered the home phone and she said the patient was still asleep

## 2017-12-21 ENCOUNTER — TELEPHONE (OUTPATIENT)
Dept: INTERNAL MEDICINE CLINIC | Age: 82
End: 2017-12-21

## 2017-12-21 NOTE — TELEPHONE ENCOUNTER
Betsey Essex from [facility] called-  The Order for nebulizer was received but the note  has to mention  nebulizer-the note does not mention it and Medicare will not cover it unless the note mentions the need for it. Please fix and refax    #  [phone]  Fax #  [phone]  Betsey Essex

## 2017-12-26 ENCOUNTER — OFFICE VISIT (OUTPATIENT)
Dept: INTERNAL MEDICINE CLINIC | Age: 82
End: 2017-12-26

## 2017-12-26 ENCOUNTER — HOME HEALTH ADMISSION (OUTPATIENT)
Dept: HOME HEALTH SERVICES | Facility: HOME HEALTH | Age: 82
End: 2017-12-26
Payer: MEDICARE

## 2017-12-26 VITALS
DIASTOLIC BLOOD PRESSURE: 75 MMHG | HEART RATE: 75 BPM | HEIGHT: 66 IN | WEIGHT: 142.2 LBS | SYSTOLIC BLOOD PRESSURE: 108 MMHG | TEMPERATURE: 97 F | BODY MASS INDEX: 22.85 KG/M2 | OXYGEN SATURATION: 98 % | RESPIRATION RATE: 16 BRPM

## 2017-12-26 DIAGNOSIS — I10 ESSENTIAL HYPERTENSION: ICD-10-CM

## 2017-12-26 DIAGNOSIS — I25.10 ASHD (ARTERIOSCLEROTIC HEART DISEASE): ICD-10-CM

## 2017-12-26 DIAGNOSIS — F02.80 LATE ONSET ALZHEIMER'S DISEASE WITHOUT BEHAVIORAL DISTURBANCE (HCC): Primary | ICD-10-CM

## 2017-12-26 DIAGNOSIS — Z91.89 AT RISK FOR ASPIRATION: ICD-10-CM

## 2017-12-26 DIAGNOSIS — R73.09 ABNORMAL GLUCOSE: ICD-10-CM

## 2017-12-26 DIAGNOSIS — G30.1 LATE ONSET ALZHEIMER'S DISEASE WITHOUT BEHAVIORAL DISTURBANCE (HCC): Primary | ICD-10-CM

## 2017-12-26 DIAGNOSIS — R13.10 DYSPHAGIA, UNSPECIFIED TYPE: ICD-10-CM

## 2017-12-26 DIAGNOSIS — Z71.89 ACP (ADVANCE CARE PLANNING): ICD-10-CM

## 2017-12-26 DIAGNOSIS — Z91.81 RISK FOR FALLS: ICD-10-CM

## 2017-12-26 DIAGNOSIS — E78.5 HYPERLIPIDEMIA, UNSPECIFIED HYPERLIPIDEMIA TYPE: ICD-10-CM

## 2017-12-26 DIAGNOSIS — Z00.00 MEDICARE ANNUAL WELLNESS VISIT, SUBSEQUENT: ICD-10-CM

## 2017-12-26 DIAGNOSIS — E55.9 VITAMIN D DEFICIENCY: ICD-10-CM

## 2017-12-26 DIAGNOSIS — R53.81 DEBILITATED PATIENT: ICD-10-CM

## 2017-12-26 DIAGNOSIS — I48.0 PAROXYSMAL ATRIAL FIBRILLATION (HCC): ICD-10-CM

## 2017-12-26 RX ORDER — DRONABINOL 5 MG/1
5 CAPSULE ORAL 3 TIMES DAILY
COMMUNITY
Start: 2017-12-20 | End: 2017-12-27 | Stop reason: DRUGHIGH

## 2017-12-26 RX ORDER — SODIUM FLUORIDE 1.1 G/100G
GEL, DENTIFRICE ORAL 2 TIMES DAILY
COMMUNITY
Start: 2017-11-28 | End: 2019-01-01

## 2017-12-26 RX ORDER — ALBUTEROL SULFATE 0.83 MG/ML
2.5 SOLUTION RESPIRATORY (INHALATION)
Qty: 24 EACH | Refills: 0 | Status: CANCELLED | OUTPATIENT
Start: 2017-12-26

## 2017-12-26 RX ORDER — WITCH HAZEL 50 %
3000 PADS, MEDICATED (EA) TOPICAL DAILY
COMMUNITY
End: 2019-01-01

## 2017-12-26 RX ORDER — NEBULIZER AND COMPRESSOR
1 EACH MISCELLANEOUS
Qty: 1 EACH | Refills: 0 | Status: CANCELLED | OUTPATIENT
Start: 2017-12-26

## 2017-12-26 NOTE — PROGRESS NOTES
HPI:   Clare Nephew is a 80y.o. year old male who presents today for evaluation of hypertension, hyperlipidemia, dementia due to Alzheimer's disease, ASCVD s/p PCI of LAD 2011 and h/o CVA, and h/o SVT. He is accompanied by his caregiver and daughter, who provides all of the history. He was evaluated by NP Delia Rodriguez on 12/14/2017 for a productive cough and weakness. Chest x-ray showed retrocardiac opacity which could represent atelectasis, pneumonia, or pleural effusion. He was treated with doxycycline for 10 days, and returns today for follow-up. The daughter reports significant improvement after several days of antibiotics. She reports that he has had complete resolution of his cough, and appears to have returned to baseline regarding ambulation and strength. She reports that he has remained fairly cooperative, and he now has 24 hour caregivers in the home. His memory continues to slowly decline. He is ambulating with a cane and rollator, and she has installed a ramp for him to enter and leave the home. She states that he has not had any recent falls. His appetite and oral intake has improved since his last visit. She is concerned that he has some gurgling sounds after swallowing, although she states that he does not cough or appear to gag. He is sleeping well, and she has elevated the head of his bed since his recent episode with pneumonia. She reports that he requires assistance with all of his ADL's. He has a history of dementia due to Alzheimer's disease that was first noticed in 1/2012 and progressed with significant worsening in 4/2014. 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 The patient was started on Aricept, with improvement noted in his mood and behavior.  In 9/2015, he was noted to have a jerking movement of his head to the right in the morning that lasted 30 seconds. There was no loss of consciousness, but the patient slept for several hours after the event. He was evaluated by Dr. Francisco Javier Ronquillo in 11/2015 for concerns that this may have been seizure activity. Evaluation included: EEG with mild generalized slowing consistent with dementia and no epileptiform activity. ESR/ T. Pallidum FTA-Ab/ folate/ SINDY/ ceruloplasmin/ copper/ TSH/ and B12 were all normal. Repeat MRI (11/2015) revealed a possiblly new subacute infarct in right periatrial white matter; otherwise unchanged from 2014. Carotid duplex scan (12/2015) showed mild (< 50%) stenosis bilaterally of the internal carotid arteries. According to the daughter, she was told by Dr. Francisco Javier Ronquillo that the episode from 9/2015 was most likely due to a small stroke. He is currently being treated with Aricept and Namenda. He has a history of hypertension and hyperlipidemia, and in 1/2012, developed an acute MI while driving home to [place] from [place]. He presented with acute coronary syndrome to [facility] (records unavailable) and reportedly underwent PCI and stent placement in the LAD. Left ventricular ejection fraction was reportedly depressed at 30% immediately post MI. He was treated with aspirin and clopidogrel. He reportedly did not have follow-up with Cardiology until 5/2014 when he was referred to see Dr. Maritza Diaz. Repeat echocardiogram in 10/2015 revealed mild concentric LVH with normal LV function (EF 65%) with mild MR and TR. He has remained asymptomatic and his regimen includes aspirin (81 mg), clopidogrel and high intensity dose atorvastatin. He was previously on carvedilol, ramipril, and hydrochlorothiazide for hypertension, but these were discontinued given episodes of hypotension. He is now being followed by Dr. Forrest Barrett.  At his last visit with Dr. Forrest Barrett on 4/24/2017, he was found to have new onset rate-controlled atrial fibrillation. The decision was made not to proceed with anti-coagulation given his advanced dementia and fall risk. He also has a history of retinal detachment and is being followed by Dr. Sheng Tsang. Past Medical History:   Diagnosis Date    Acute coronary syndrome ([address] 12/26/2011    PCI of LAD at [facility] in [place].  Dementia due to Alzheimer's disease     Hepatitis B     about 60 years ago    Hyperlipidemia     Hypertension     Paroxysmal SVT (supraventricular tachycardia) (HCC)     Stroke [facility])     carotid artery right side     Past Surgical History:   Procedure Laterality Date    CARDIAC SURG PROCEDURE UNLIST      HX HEENT      tonsillectomy     Current Outpatient Prescriptions   Medication Sig    cyanocobalamin (VITAMIN B-12) 2,000 mcg TbER Take 2,000 mcg by mouth daily.  dronabinol (MARINOL) 5 mg capsule Take 5 mg by mouth three (3) times daily.  DENTA 5000 PLUS 1.1 % crea Take  by mouth two (2) times a day. Indications: DENTAL PLAQUE PREVENTION, GINGIVITIS    MULTIVIT-MIN/FA/LYCOPEN/LUTEIN (CENTRUM SILVER ULTRA MEN'S PO) Take  by mouth daily.  B.infantis-B.ani-B.long-B.bifi (PROBIOTIC 4X) 10-15 mg TbEC Take  by mouth daily.  co-enzyme Q-10 (CO Q-10) 100 mg capsule Take 200 mg by mouth daily.  ASCORBIC ACID/MULTIVIT-MIN (EMERGEN-C PO) Take  by mouth.  memantine (NAMENDA) 5 mg tablet TAKE 1 TAB BY MOUTH DAILY.  fluticasone (FLONASE) 50 mcg/actuation nasal spray 2 Sprays by Both Nostrils route daily.  atorvastatin (LIPITOR) 40 mg tablet TAKE ONE TABLET BY MOUTH ONE TIME DAILY    clopidogrel (PLAVIX) 75 mg tab Take 1 Tab by mouth daily.  donepezil (ARICEPT) 10 mg tablet Take 1 Tab by mouth nightly.  nitroglycerin (NITROSTAT) 0.4 mg SL tablet 1 tab subling every 5 min for chest pain  Maximum 3 doses for any 1 episode    Cholecalciferol, Vitamin D3, (VITAMIN D3) 1,000 unit cap Take 1,000 Units by mouth daily.     omega-3 fatty acids-vitamin e (FISH OIL) 1,000 mg Cap Take 1 Cap by mouth.  aspirin 81 mg tablet Take 81 mg by mouth.  carvedilol (COREG) 3.125 mg tablet TAKE ONE TABLET BY MOUTH TWICE A DAY WITH MEALS    cetirizine (ZYRTEC) 10 mg tablet Take  by mouth daily. No current facility-administered medications for this visit. Allergies and Intolerances: Allergies   Allergen Reactions    Latex Other (comments)     Daughter is not sure why patient is allergic    Bacitracin Rash     blisters    Sulfa (Sulfonamide Antibiotics) Unknown (comments)    Antihistamines - Alkylamine Unknown (comments)     Family History:   Family History   Problem Relation Age of Onset    Diabetes Sister     Stroke Sister      Social History:   He  reports that he has quit smoking. He has never used smokeless tobacco. He is . He lives with his daughter. He also has two sons. History   Alcohol Use No     Immunization History:  Immunization History   Administered Date(s) Administered    Influenza High Dose Vaccine PF 10/06/2014, 11/10/2015, 09/23/2017    Influenza Vaccine Whole 12/30/2009    Pneumococcal Conjugate (PCV-13) 11/10/2015    TD Vaccine 12/16/2003    Tdap 11/10/2015    ZZZ-RETIRED (DO NOT USE) Pneumococcal Vaccine (Unspecified Type) 12/23/2005       Review of Systems:   As above included in HPI. Otherwise 11 point review of systems negative including constitutional, skin, HENT, eyes, respiratory, cardiovascular, gastrointestinal, genitourinary, musculoskeletal, endo/heme/aller, neurological.    Physical:   Vitals:   BP: 108/75  HR: 75  WT: 142 lb 3.2 oz (64.5 kg)  BMI:  22.95 kg/m2    Exam:   Pt appears well; alert and oriented x 3; appropriate affect. HEENT: PERRLA, anicteric, cerumen in right ear canal, oropharynx clear, no JVD, adenopathy or thyromegaly. No carotid bruits or radiated murmur. Lungs: clear to auscultation, no wheezes, rhonchi, or rales. Heart: irregular rate and rhythm.  No murmur, rubs, gallops  Abdomen: soft, nontender, nondistended, normal bowel sounds, no hepatosplenomegaly or masses. Extremities: without edema. Pulses 1-2+ bilaterally. Review of Data:  Labs:  Hospital Outpatient Visit on 12/14/2017   Component Date Value Ref Range Status    WBC 12/14/2017 8.5  4.6 - 13.2 K/uL Final    RBC 12/14/2017 4.97  4.70 - 5.50 M/uL Final    HGB 12/14/2017 15.5  13.0 - 16.0 g/dL Final    HCT 12/14/2017 46.8  36.0 - 48.0 % Final    MCV 12/14/2017 94.2  74.0 - 97.0 FL Final    MCH 12/14/2017 31.2  24.0 - 34.0 PG Final    MCHC 12/14/2017 33.1  31.0 - 37.0 g/dL Final    RDW 12/14/2017 13.8  11.6 - 14.5 % Final    PLATELET 26/61/9770 934  135 - 420 K/uL Final    MPV 12/14/2017 10.8  9.2 - 11.8 FL Final    NEUTROPHILS 12/14/2017 87* 40 - 73 % Final    LYMPHOCYTES 12/14/2017 7* 21 - 52 % Final    MONOCYTES 12/14/2017 6  3 - 10 % Final    EOSINOPHILS 12/14/2017 0  0 - 5 % Final    BASOPHILS 12/14/2017 0  0 - 2 % Final    ABS. NEUTROPHILS 12/14/2017 7.4  1.8 - 8.0 K/UL Final    ABS. LYMPHOCYTES 12/14/2017 0.6* 0.9 - 3.6 K/UL Final    ABS. MONOCYTES 12/14/2017 0.5  0.05 - 1.2 K/UL Final    ABS. EOSINOPHILS 12/14/2017 0.0  0.0 - 0.4 K/UL Final    ABS.  BASOPHILS 12/14/2017 0.0  0.0 - 0.06 K/UL Final    DF 12/14/2017 AUTOMATED    Final    Hemoglobin A1c 12/14/2017 5.4  4.2 - 5.6 % Final    Est. average glucose 12/14/2017 108  mg/dL Final    Sodium 12/14/2017 141  136 - 145 mmol/L Final    Potassium 12/14/2017 3.4* 3.5 - 5.5 mmol/L Final    Chloride 12/14/2017 106  100 - 108 mmol/L Final    CO2 12/14/2017 24  21 - 32 mmol/L Final    Anion gap 12/14/2017 11  3.0 - 18 mmol/L Final    Glucose 12/14/2017 198* 74 - 99 mg/dL Final    BUN 12/14/2017 14  7.0 - 18 MG/DL Final    Creatinine 12/14/2017 1.21  0.6 - 1.3 MG/DL Final    BUN/Creatinine ratio 12/14/2017 12  12 - 20   Final    GFR est AA 12/14/2017 >60  >60 ml/min/1.73m2 Final    GFR est non-AA 12/14/2017 57* >60 ml/min/1.73m2 Final    Calcium 12/14/2017 8.4* 8.5 - 10.1 MG/DL Final    Bilirubin, total 12/14/2017 2.4* 0.2 - 1.0 MG/DL Final    ALT (SGPT) 12/14/2017 23  16 - 61 U/L Final    AST (SGOT) 12/14/2017 18  15 - 37 U/L Final    Alk. phosphatase 12/14/2017 72  45 - 117 U/L Final    Protein, total 12/14/2017 6.7  6.4 - 8.2 g/dL Final    Albumin 12/14/2017 3.5  3.4 - 5.0 g/dL Final    Globulin 12/14/2017 3.2  2.0 - 4.0 g/dL Final    A-G Ratio 12/14/2017 1.1  0.8 - 1.7   Final    TSH 12/14/2017 1.31  0.36 - 3.74 uIU/mL Final     Health Maintenance:  Screening:    Colorectal: colonoscopy (2003) normal. No further screening needed. Depression: none   DM (HbA1c/FPG): HbA1c 5.4 (12/2017)   Hepatitis C: N/A   Falls: No recent falls. Using rollator and cane. Debilitation with unsteady gait. DEXA: N/A   PSA/PLACIDO: No further screening needed. Glaucoma: regular eye exams with Dr. Mariajose Holder (last 11/2016)   Smoking: none   Vitamin D: 45.3 (11/2016)   Medicare Wellness: today    Impression:  Patient Active Problem List   Diagnosis Code    SVT (supraventricular tachycardia) ([facility]) I47.1    Hyperlipidemia E78.5    ASHD (arteriosclerotic heart disease)LAD stent 2011 I25.10    Neurogenic bladder N31.9    Alzheimer's disease G30.9    Dysphagia R13.10    Essential hypertension I10    Inguinal hernia unilateral, non-recurrent K40.90    PAD (peripheral artery disease) ([facility]) I73.9    Allergic contact dermatitis L23.9    Atrial fibrillation ([facility]) I48.91    Abnormal glucose R73.09       Plan:  1. Pneumonia. Patient with recent productive cough, weakness, and chest xray showing retrocardiac opacity. Treated with doxycycline with clinical improvement and resolution of cough. Exam normal today without wheezing. Continue to follow. 2. Hypertension. Blood pressure well controlled without medication. Previously was on carvedilol, ramipril, and hydrochlorothiazide. Renal function remains stable  with creatinine 1.21 / eGFR 57.  Encouraged to continue drinking plenty of fluids. Continue to follow. 3. Alzheimer's disease. Slowly progressive. On Aricept and Namenda. Started on Marinol for decrease in appetite, and reports that appetite has now improved and weight appears to have stabilized. Now with 24 hour care. Fall precautions stressed. Follow. 4. Prior \"seizure-like\" episode. Felt to be due to small stroke. On clopidogrel and aspirin. No recurrence. Follow. 5. ASHD. Currently asymptomatic and stable on current regimen. LV function recovered after initially stunning post-MI. On aspirin, clopidogrel, and statin. Being followed by Dr. Moshe Estrada .   6. Atrial fibrillation. Opting not to proceed with long term anticoagulation ([id number] = 5) given fall risk and advanced dementia. On aspirin and Plavix. No longer on carvedilol given difficulty with hypotension. Continue to follow. 7. Hyperlipidemia. On high intensity dose atorvastatin with LDL 53.8, indicative of excellent control. However, having difficulty with swallowing large pill. Daughter will inquire with pharmacy if rosuvastatin is smaller. Continue to follow. 8. Abnormal glucose. HbA1c in normal range despite elevated fasting blood sugar. Continue to follow. 9. Aspiration risk. Daughter reports noting \"gurgling\" after eating. Concern given recent pneumonia. Will ask for speech evaluation to rule out. 10. Fall risk. Patient with unsteady gait and requiring cane or rollator for ambulation. Debilitated status and concern for mobility. Will order wheelchair. 11. Health maintenance. Already received influenza vaccine. Not wishing for him to have Zoster vaccine. Other immunizations up to date. No further colorectal or prostate screening. Vitamin D level normal. Fall precautions discussed. Continue regular eye exams with Dr. Haley Tirado.     Total time: 40 minutes spent with the patient in face-to-face consultation of which greater than 50% was spent on counseling, answering questions and/or coordination of care. Complex medical review and management performed. Patient understands recommendations and agrees with plan. Follow-up in 6 months.

## 2017-12-26 NOTE — PROGRESS NOTES
This is a Subsequent Medicare Annual Wellness Exam (AWV) (Performed 12 months after IPPE or effective date of Medicare Part B enrollment)    I have reviewed the patient's medical history in detail and updated the computerized patient record. History     Past Medical History:   Diagnosis Date    Acute coronary syndrome ([address] 12/26/2011    PCI of LAD at [facility] in [place].  Dementia due to Alzheimer's disease     Hepatitis B     about 60 years ago    Hyperlipidemia     Hypertension     Paroxysmal SVT (supraventricular tachycardia) (HCC)     Stroke [facility])     carotid artery right side      Past Surgical History:   Procedure Laterality Date    CARDIAC SURG PROCEDURE UNLIST      HX HEENT      tonsillectomy     Current Outpatient Prescriptions   Medication Sig Dispense Refill    cyanocobalamin (VITAMIN B-12) 2,000 mcg TbER Take 2,000 mcg by mouth daily.  dronabinol (MARINOL) 5 mg capsule Take 5 mg by mouth three (3) times daily.  DENTA 5000 PLUS 1.1 % crea Take  by mouth two (2) times a day. Indications: DENTAL PLAQUE PREVENTION, GINGIVITIS      MULTIVIT-MIN/FA/LYCOPEN/LUTEIN (CENTRUM SILVER ULTRA MEN'S PO) Take  by mouth daily.  B.infantis-B.ani-B.long-B.bifi (PROBIOTIC 4X) 10-15 mg TbEC Take  by mouth daily.  co-enzyme Q-10 (CO Q-10) 100 mg capsule Take 200 mg by mouth daily.  ASCORBIC ACID/MULTIVIT-MIN (EMERGEN-C PO) Take  by mouth.  memantine (NAMENDA) 5 mg tablet TAKE 1 TAB BY MOUTH DAILY. 30 Tab 5    fluticasone (FLONASE) 50 mcg/actuation nasal spray 2 Sprays by Both Nostrils route daily. 3 Bottle 3    atorvastatin (LIPITOR) 40 mg tablet TAKE ONE TABLET BY MOUTH ONE TIME DAILY 90 Tab 3    clopidogrel (PLAVIX) 75 mg tab Take 1 Tab by mouth daily. 30 Tab 11    donepezil (ARICEPT) 10 mg tablet Take 1 Tab by mouth nightly.  90 Tab 3    nitroglycerin (NITROSTAT) 0.4 mg SL tablet 1 tab subling every 5 min for chest pain  Maximum 3 doses for any 1 episode 25 Tab 5  Cholecalciferol, Vitamin D3, (VITAMIN D3) 1,000 unit cap Take 1,000 Units by mouth daily.  omega-3 fatty acids-vitamin e (FISH OIL) 1,000 mg Cap Take 1 Cap by mouth.  aspirin 81 mg tablet Take 81 mg by mouth.  cetirizine (ZYRTEC) 10 mg tablet Take  by mouth daily. Allergies   Allergen Reactions    Latex Other (comments)     Daughter is not sure why patient is allergic    Bacitracin Rash     blisters    Sulfa (Sulfonamide Antibiotics) Unknown (comments)    Antihistamines - Alkylamine Unknown (comments)     Family History   Problem Relation Age of Onset    Diabetes Sister     Stroke Sister      Social History   Substance Use Topics    Smoking status: Former Smoker    Smokeless tobacco: Never Used    Alcohol use No     Patient Active Problem List   Diagnosis Code    Hyperlipidemia E78.5    ASHD (arteriosclerotic heart disease)LAD stent 2011 I25.10    Neurogenic bladder N31.9    Alzheimer's disease G30.9    Dysphagia R13.10    Essential hypertension I10    Inguinal hernia unilateral, non-recurrent K40.90    PAD (peripheral artery disease) ([facility]) I73.9    Allergic contact dermatitis L23.9    Atrial fibrillation ([facility]) I48.91    Abnormal glucose R73.09       Depression Risk Factor Screening:     PHQ over the last two weeks 5/23/2017   Little interest or pleasure in doing things Not at all   Feeling down, depressed or hopeless Several days   Total Score PHQ 2 1     Alcohol Risk Factor Screening: You do not drink alcohol or very rarely. Functional Ability and Level of Safety:   Hearing Loss  Hearing is fair.  Has upcoming appointment with Dr. Emilee Lopez for cerumen removal. .    Activities of Daily Living  The home contains: handrails and grab bars  Patient needs help with:  phone, transportation, shopping, preparing meals, laundry, housework, managing medications, managing money, eating, dressing, bathing, hygiene, bathroom needs and walking    Fall Risk  Fall Risk Assessment, last 12 mths 5/23/2017   Able to walk? Yes   Fall in past 12 months? No   Fall with injury? -   Number of falls in past 12 months -   Fall Risk Score -       Abuse Screen  Patient is not abused    Cognitive Screening   Evaluation of Cognitive Function:  Has your family/caregiver stated any concerns about your memory: yes  Abnormal; patient with advanced Alzheimer's dementia    Patient Care Team   Patient Care Team:  Nicolette Donahue MD as PCP - General (Internal Medicine)  Quan Cheek DPM (Podiatry)  Dontae Reza MD (Gastroenterology)  Claude Arredondo MD (Plastic Surgery)  Yanelis Cox MD (Neurology)  Florentin Dean RN as Ambulatory Care Navigator (Internal Medicine)  Cristobal Grady MD (Ophthalmology)  Romero Rossi MD (Cardiology)  Eliana Huitron MD (Otolaryngology)    Assessment/Plan   Education and counseling provided:  Are appropriate based on today's review and evaluation  End-of-Life planning (with patient's consent)  Influenza Vaccine  Screening for glaucoma  Diabetes screening test    Diagnoses and all orders for this visit:    1. Late onset Alzheimer's disease without behavioral disturbance  -     AMB SUPPLY ORDER  -     VITAMIN D, 25 HYDROXY; Future    2. Debilitated patient  -     AMB SUPPLY ORDER  -     METABOLIC PANEL, COMPREHENSIVE; Future    3. Risk for falls  -     AMB SUPPLY ORDER  -     VITAMIN D, 25 HYDROXY; Future    4. At risk for aspiration  -     [id number] [place]    5. Essential hypertension  -     CBC WITH AUTOMATED DIFF; Future  -     METABOLIC PANEL, COMPREHENSIVE; Future    6. Paroxysmal atrial fibrillation (HCC)    7. ASHD (arteriosclerotic heart disease)LAD stent 2011    8. Dysphagia, unspecified type    9. Hyperlipidemia, unspecified hyperlipidemia type  -     LIPID PANEL; Future    10. Abnormal glucose  -     HEMOGLOBIN A1C WITH EAG; Future    11. Medicare annual wellness visit, subsequent    12. ACP (advance care planning)    13.  Vitamin D deficiency   - VITAMIN D, 25 HYDROXY; Future        There are no preventive care reminders to display for this patient.

## 2017-12-26 NOTE — PROGRESS NOTES
Chief Complaint   Patient presents with    Hypertension     follow up    Cholesterol Problem     follow up    Neurologic Problem     unsteady gait follow up    Pneumonia     follow up and discuss getting DME Nebulizer Machine and DME Wheelchair   [employer] Visit       1. Have you been to the ER, urgent care clinic since your last visit? Hospitalized since your last visit? No    2. Have you seen or consulted any other health care providers outside of the [address] [place] [address] since your last visit? Include any pap smears or colon screening.  No

## 2017-12-26 NOTE — ACP (ADVANCE CARE PLANNING)
Advance care planning discussion initiated. Patient has a power of  on file, designating his daughter Gaye Naik. Patient has advanced Alzheimer's dementia and is unable to make decisions. No living will on file, but daughter states that she is aware of his wishes and would not wish life prolonging measures for end of life care.

## 2017-12-26 NOTE — MR AVS SNAPSHOT
Visit Information Date & Time Provider Department Dept. Phone Encounter #  
 12/26/2017 10:30 AM Huber Springer MD [facility] [address] [place] [address] [phone] Follow-up Instructions Return in about 6 months (around 6/26/2018), or if symptoms worsen or fail to improve. Your Appointments 6/21/2018  8:05 AM  
LAB with [facility] NURSE VISIT [facility] [address] [place] [address] ([facility]) Appt Note: lab  
 [address], [address]  
  
   
 [address], [address]  
  
    
 6/28/2018 10:30 AM  
Office Visit with Huber Springer MD  
[facility] [address] Appt Note: 6 month f/u  
 [address] [facility], [address]  
  
   
 [address], [address] Upcoming Health Maintenance Date Due  
 MEDICARE YEARLY EXAM 11/18/2017 GLAUCOMA SCREENING Q2Y 11/28/2018 DTaP/Tdap/Td series (2 - Td) 11/20/2026 Allergies as of 12/26/2017  Review Complete On: 12/26/2017 By: Rasta Boyer Severity Noted Reaction Type Reactions Latex Medium 12/26/2017    Other (comments) Daughter is not sure why patient is allergic Bacitracin High 12/26/2017    Rash  
 blisters Sulfa (Sulfonamide Antibiotics) High 07/07/2011    Unknown (comments) Antihistamines - Alkylamine  07/07/2011    Unknown (comments) Current Immunizations  Reviewed on 11/10/2015 Name Date Influenza High Dose Vaccine PF 9/23/2017, 11/10/2015  2:39 PM, 10/6/2014 Influenza Vaccine Whole 12/30/2009 Pneumococcal Conjugate (PCV-13) 11/10/2015  2:41 PM  
 TD Vaccine 12/16/2003 Tdap 11/10/2015  2:54 PM  
 ZZZ-RETIRED (DO NOT USE) Pneumococcal Vaccine (Unspecified Type) 12/23/2005 Not reviewed this visit You Were Diagnosed With   
  
 Codes Comments  Late onset Alzheimer's disease without behavioral disturbance    - Primary ICD-10-CM: G30.1, F02.80 ICD-9-CM: 331.0, 294.10 Debilitated patient     ICD-10-CM: R50.80 ICD-9-CM: 799.3 Risk for falls     ICD-10-CM: Z91.81 
ICD-9-CM: V15.88 Vitals BP Pulse Temp Resp Height(growth percentile) Weight(growth percentile) 108/75 (BP 1 Location: Right arm, BP Patient Position: Sitting) 75 97 °F (36.1 °C) (Oral) 16 5' 6\" (1.676 m) 142 lb 3.2 oz (64.5 kg) SpO2 BMI Smoking Status 98% 22.95 kg/m2 Former Smoker BMI and BSA Data Body Mass Index Body Surface Area  
 22.95 kg/m 2 1.73 m 2 Preferred Pharmacy Pharmacy Name Phone [facility], [address] [place] [address] [phone] Your Updated Medication List  
  
   
This list is accurate as of: 12/26/17 11:27 AM.  Always use your most recent med list.  
  
  
  
  
 aspirin 81 mg tablet Take 81 mg by mouth. atorvastatin 40 mg tablet Commonly known as:  LIPITOR  
TAKE ONE TABLET BY MOUTH ONE TIME DAILY  
  
 carvedilol 3.125 mg tablet Commonly known as:  COREG  
TAKE ONE TABLET BY MOUTH TWICE A DAY WITH MEALS CENTRUM SILVER ULTRA MEN'S PO Take  by mouth daily. cetirizine 10 mg tablet Commonly known as:  ZYRTEC Take  by mouth daily. clopidogrel 75 mg Tab Commonly known as:  PLAVIX Take 1 Tab by mouth daily. CO Q-10 100 mg capsule Generic drug:  co-enzyme Q-10 Take 200 mg by mouth daily. DENTA 5000 PLUS 1.1 % Crea Generic drug:  fluoride (sodium) Take  by mouth two (2) times a day. Indications: DENTAL PLAQUE PREVENTION, GINGIVITIS  
  
 donepezil 10 mg tablet Commonly known as:  ARICEPT Take 1 Tab by mouth nightly. dronabinol 5 mg capsule Commonly known as:  Dellia Music Take 5 mg by mouth three (3) times daily. EMERGEN-C PO Take  by mouth. FISH OIL 1,000 mg Cap Generic drug:  omega-3 fatty acids-vitamin e Take 1 Cap by mouth. fluticasone 50 mcg/actuation nasal spray Commonly known as:  Ying Peck 2 Sprays by Both Nostrils route daily. memantine 5 mg tablet Commonly known as:  Gwynda Peppers TAKE 1 TAB BY MOUTH DAILY. nitroglycerin 0.4 mg SL tablet Commonly known as:  NITROSTAT  
1 tab subling every 5 min for chest pain  Maximum 3 doses for any 1 episode PROBIOTIC 4X 10-15 mg Tbec Generic drug:  B.infantis-B.ani-B.long-B.bifi Take  by mouth daily. VITAMIN B-12 2,000 mcg Tber Generic drug:  cyanocobalamin Take 2,000 mcg by mouth daily. VITAMIN D3 1,000 unit Cap Generic drug:  cholecalciferol Take 1,000 Units by mouth daily. We Performed the Following AMB SUPPLY ORDER [[id number] Custom] Comments:  
 Wheelchair with Elevating Legrests Follow-up Instructions Return in about 6 months (around 6/26/2018), or if symptoms worsen or fail to improve. Patient Instructions Preventing Falls: Care Instructions Your Care Instructions Getting around your home safely can be a challenge if you have injuries or health problems that make it easy for you to fall. Loose rugs and furniture in walkways are among the dangers for many older people who have problems walking or who have poor eyesight. People who have conditions such as arthritis, osteoporosis, or dementia also have to be careful not to fall. You can make your home safer with a few simple measures. Follow-up care is a key part of your treatment and safety. Be sure to make and go to all appointments, and call your doctor if you are having problems. It's also a good idea to know your test results and keep a list of the medicines you take. How can you care for yourself at home? Taking care of yourself · You may get dizzy if you do not drink enough water.  To prevent dehydration, drink plenty of fluids, enough so that your urine is light yellow or clear like water. Choose water and other caffeine-free clear liquids. If you have kidney, heart, or liver disease and have to limit fluids, talk with your doctor before you increase the amount of fluids you drink. · Exercise regularly to improve your strength, muscle tone, and balance. Walk if you can. Swimming may be a good choice if you cannot walk easily. · Have your vision and hearing checked each year or any time you notice a change. If you have trouble seeing and hearing, you might not be able to avoid objects and could lose your balance. · Know the side effects of the medicines you take. Ask your doctor or pharmacist whether the medicines you take can affect your balance. Sleeping pills or sedatives can affect your balance. · Limit the amount of alcohol you drink. Alcohol can impair your balance and other senses. · Ask your doctor whether calluses or corns on your feet need to be removed. If you wear loose-fitting shoes because of calluses or corns, you can lose your balance and fall. · Talk to your doctor if you have numbness in your feet. Preventing falls at home · Remove raised doorway thresholds, throw rugs, and clutter. Repair loose carpet or raised areas in the floor. · Move furniture and electrical cords to keep them out of walking paths. · Use nonskid floor wax, and wipe up spills right away, especially on ceramic tile floors. · If you use a walker or cane, put rubber tips on it. If you use crutches, clean the bottoms of them regularly with an abrasive pad, such as steel wool. · Keep your house well lit, especially [facility], and outside walkways. Use night-lights in areas such as hallways and bathrooms. Add extra light switches or use remote switches (such as switches that go on or off when you clap your hands) to make it easier to turn lights on if you have to get up during the night. · Install sturdy handrails on stairways. · Move items in your cabinets so that the things you use a lot are on the lower shelves (about waist level). · Keep a cordless phone and a flashlight with new batteries by your bed. If possible, put a phone in each of the main rooms of your house, or carry a cell phone in case you fall and cannot reach a phone. Or, you can wear a device around your neck or wrist. You push a button that sends a signal for help. · Wear low-heeled shoes that fit well and give your feet good support. Use footwear with nonskid soles. Check the heels and soles of your shoes for wear. Repair or replace worn heels or soles. · Do not wear socks without shoes on wood floors. · Walk on the grass when the sidewalks are slippery. If you live in an area that gets snow and ice in the winter, sprinkle salt on slippery steps and sidewalks. Preventing falls in the bath · Install grab bars and nonskid mats inside and outside your shower or tub and near the toilet and sinks. · Use shower chairs and bath benches. · Use a hand-held shower head that will allow you to sit while showering. · Get into a tub or shower by putting the weaker leg in first. Get out of a tub or shower with your strong side first. 
· Repair loose toilet seats and consider installing a raised toilet seat to make getting on and off the toilet easier. · Keep your bathroom door unlocked while you are in the shower. Where can you learn more? Go to http://esteban-linh.info/. Enter [phone] in the search box to learn more about \"Preventing Falls: Care Instructions. \" Current as of: May 12, 2017 Content Version: 11.4 © 6486-1782 Healthwise, Incorporated. Care instructions adapted under license by [employer] (which disclaims liability or warranty for this information).  If you have questions about a medical condition or this instruction, always ask your healthcare professional. Lindsay Sanchez[employer] disclaims any warranty or liability for your use of this information. Introducing [facility]! Marisa Smith introduces [employer] patient portal. Now you can access parts of your medical record, email your doctor's office, and request medication refills online. 1. In your internet browser, go to [email or web address] 2. Click on the First Time User? Click Here link in the Sign In box. You will see the New Member Sign Up page. 3. Enter your [employer] Access Code exactly as it appears below. You will not need to use this code after youve completed the sign-up process. If you do not sign up before the expiration date, you must request a new code. · [employer] Access Code: [email or web address] Expires: 3/14/2018 10:50 AM 
 
4. Enter the last four digits of your Social Security Number (xxxx) and Date of Birth (mm/dd/yyyy) as indicated and click Submit. You will be taken to the next sign-up page. 5. Create a [employer] ID. This will be your [employer] login ID and cannot be changed, so think of one that is secure and easy to remember. 6. Create a [employer] password. You can change your password at any time. 7. Enter your Password Reset Question and Answer. This can be used at a later time if you forget your password. 8. Enter your e-mail address. You will receive e-mail notification when new information is available in [address] 9. Click Sign Up. You can now view and download portions of your medical record. 10. Click the Download Summary menu link to download a portable copy of your medical information. If you have questions, please visit the Frequently Asked Questions section of the [employer] website. Remember, [employer] is NOT to be used for urgent needs. For medical emergencies, dial 911. Now available from your iPhone and Android! Please provide this summary of care documentation to your next provider. Your primary care clinician is listed as Mario Alberto Kline. If you have any questions after today's visit, please call [phone].

## 2017-12-26 NOTE — PATIENT INSTRUCTIONS

## 2017-12-26 NOTE — PROGRESS NOTES
Advance Care Planning (ACP) Provider Note - Comprehensive     Date of ACP Conversation: 12/26/17  Persons included in Conversation:  POA  Length of ACP Conversation in minutes:  16 minutes    Authorized Decision Maker (if patient is incapable of making informed decisions): daughter, Nevelyn Spurling  This person is:  Healthcare Agent/Medical Power of  under Advance Directive          General ACP for ALL Patients with Decision Making Capacity:   Importance of advance care planning, including choosing a healthcare agent to communicate patient's healthcare decisions if patient lost the ability to make decisions, such as after a sudden illness or accident  Understanding of the healthcare agent role was assessed and information provided  Exploration of values, goals, and preferences if recovery is not expected, even with continued medical treatment in the event of: Imminent death  Severe, permanent brain injury  \"In these circumstances, what matters most to you? \"  Care focused more on comfort or quality of life. Review of Existing Advance Directive:  Patient has a power of  on file, designating his daughter Nevelyn Spurling. Patient has advanced Alzheimer's dementia and is unable to make decisions. No living will on file, but daughter states that she is aware of his wishes and would not wish life prolonging measures for end of life care.     For Serious or Chronic Illness:  Understanding of medical condition      Interventions Provided:  Reviewed existing Advance Directive   Recommended review of completed ACP document annually or upon change in health status

## 2017-12-27 ENCOUNTER — HOME CARE VISIT (OUTPATIENT)
Dept: SCHEDULING | Facility: HOME HEALTH | Age: 82
End: 2017-12-27
Payer: MEDICARE

## 2017-12-27 ENCOUNTER — HOME CARE VISIT (OUTPATIENT)
Dept: HOME HEALTH SERVICES | Facility: HOME HEALTH | Age: 82
End: 2017-12-27

## 2017-12-27 PROCEDURE — G0153 HHCP-SVS OF S/L PATH,EA 15MN: HCPCS

## 2017-12-27 PROCEDURE — 400013 HH SOC

## 2017-12-27 PROCEDURE — 3331090001 HH PPS REVENUE CREDIT

## 2017-12-27 PROCEDURE — 3331090002 HH PPS REVENUE DEBIT

## 2017-12-28 VITALS
TEMPERATURE: 97.3 F | OXYGEN SATURATION: 96 % | SYSTOLIC BLOOD PRESSURE: 110 MMHG | RESPIRATION RATE: 16 BRPM | HEART RATE: 77 BPM | DIASTOLIC BLOOD PRESSURE: 90 MMHG

## 2017-12-28 PROCEDURE — 3331090002 HH PPS REVENUE DEBIT

## 2017-12-28 PROCEDURE — 3331090001 HH PPS REVENUE CREDIT

## 2017-12-29 ENCOUNTER — TELEPHONE (OUTPATIENT)
Dept: INTERNAL MEDICINE CLINIC | Age: 82
End: 2017-12-29

## 2017-12-29 PROCEDURE — 3331090002 HH PPS REVENUE DEBIT

## 2017-12-29 PROCEDURE — 3331090001 HH PPS REVENUE CREDIT

## 2017-12-29 NOTE — TELEPHONE ENCOUNTER
Chief Complaint   Patient presents with    Other     calling to check to see if Wheel Chair has been delivered by [facility]     Patient's home reached and the wheelchair has been delivered.

## 2017-12-30 ENCOUNTER — HOME CARE VISIT (OUTPATIENT)
Dept: SCHEDULING | Facility: HOME HEALTH | Age: 82
End: 2017-12-30
Payer: MEDICARE

## 2017-12-30 VITALS
RESPIRATION RATE: 17 BRPM | HEART RATE: 71 BPM | TEMPERATURE: 98.5 F | DIASTOLIC BLOOD PRESSURE: 80 MMHG | SYSTOLIC BLOOD PRESSURE: 120 MMHG

## 2017-12-30 PROCEDURE — 3331090001 HH PPS REVENUE CREDIT

## 2017-12-30 PROCEDURE — 3331090002 HH PPS REVENUE DEBIT

## 2017-12-30 PROCEDURE — G0151 HHCP-SERV OF PT,EA 15 MIN: HCPCS

## 2017-12-31 PROCEDURE — 3331090002 HH PPS REVENUE DEBIT

## 2017-12-31 PROCEDURE — 3331090001 HH PPS REVENUE CREDIT

## 2018-01-01 ENCOUNTER — TELEPHONE (OUTPATIENT)
Dept: INTERNAL MEDICINE CLINIC | Age: 83
End: 2018-01-01

## 2018-01-01 ENCOUNTER — APPOINTMENT (OUTPATIENT)
Dept: GENERAL RADIOLOGY | Age: 83
End: 2018-01-01
Attending: PHYSICIAN ASSISTANT
Payer: MEDICARE

## 2018-01-01 ENCOUNTER — HOSPITAL ENCOUNTER (EMERGENCY)
Age: 83
Discharge: HOME | End: 2018-12-13
Attending: EMERGENCY MEDICINE
Payer: MEDICARE

## 2018-01-01 ENCOUNTER — OFFICE VISIT (OUTPATIENT)
Dept: INTERNAL MEDICINE CLINIC | Age: 83
End: 2018-01-01

## 2018-01-01 VITALS
BODY MASS INDEX: 22.5 KG/M2 | TEMPERATURE: 97.5 F | HEIGHT: 66 IN | DIASTOLIC BLOOD PRESSURE: 60 MMHG | HEART RATE: 64 BPM | RESPIRATION RATE: 14 BRPM | WEIGHT: 140 LBS | SYSTOLIC BLOOD PRESSURE: 104 MMHG | OXYGEN SATURATION: 97 %

## 2018-01-01 VITALS
SYSTOLIC BLOOD PRESSURE: 129 MMHG | HEART RATE: 82 BPM | DIASTOLIC BLOOD PRESSURE: 89 MMHG | RESPIRATION RATE: 16 BRPM | OXYGEN SATURATION: 100 % | TEMPERATURE: 97.3 F

## 2018-01-01 VITALS
DIASTOLIC BLOOD PRESSURE: 66 MMHG | RESPIRATION RATE: 14 BRPM | WEIGHT: 135 LBS | OXYGEN SATURATION: 98 % | TEMPERATURE: 97.5 F | SYSTOLIC BLOOD PRESSURE: 108 MMHG | HEIGHT: 66 IN | HEART RATE: 48 BPM | BODY MASS INDEX: 21.69 KG/M2

## 2018-01-01 DIAGNOSIS — R13.10 DYSPHAGIA, UNSPECIFIED TYPE: ICD-10-CM

## 2018-01-01 DIAGNOSIS — R39.89 ABNORMAL URINE COLOR: ICD-10-CM

## 2018-01-01 DIAGNOSIS — R44.3 HALLUCINATIONS: ICD-10-CM

## 2018-01-01 DIAGNOSIS — G30.1 LATE ONSET ALZHEIMER'S DISEASE WITH BEHAVIORAL DISTURBANCE (HCC): Primary | ICD-10-CM

## 2018-01-01 DIAGNOSIS — G30.9 ALZHEIMER'S DEMENTIA WITH BEHAVIORAL DISTURBANCE, UNSPECIFIED TIMING OF DEMENTIA ONSET: Primary | ICD-10-CM

## 2018-01-01 DIAGNOSIS — R41.82 ALTERED MENTAL STATUS, UNSPECIFIED ALTERED MENTAL STATUS TYPE: ICD-10-CM

## 2018-01-01 DIAGNOSIS — Z86.73 H/O: CVA (CEREBROVASCULAR ACCIDENT): ICD-10-CM

## 2018-01-01 DIAGNOSIS — F02.818 LATE ONSET ALZHEIMER'S DISEASE WITH BEHAVIORAL DISTURBANCE (HCC): Primary | ICD-10-CM

## 2018-01-01 DIAGNOSIS — R53.81 PHYSICAL DECONDITIONING: ICD-10-CM

## 2018-01-01 DIAGNOSIS — R82.90 ABNORMAL URINE ODOR: ICD-10-CM

## 2018-01-01 DIAGNOSIS — F02.81 ALZHEIMER'S DEMENTIA WITH BEHAVIORAL DISTURBANCE, UNSPECIFIED TIMING OF DEMENTIA ONSET: Primary | ICD-10-CM

## 2018-01-01 DIAGNOSIS — N31.9 NEUROGENIC BLADDER: ICD-10-CM

## 2018-01-01 DIAGNOSIS — N30.01 ACUTE CYSTITIS WITH HEMATURIA: Primary | ICD-10-CM

## 2018-01-01 DIAGNOSIS — R63.0 DECREASED APPETITE: ICD-10-CM

## 2018-01-01 DIAGNOSIS — E86.0 DEHYDRATION: ICD-10-CM

## 2018-01-01 DIAGNOSIS — K44.9 HIATAL HERNIA: ICD-10-CM

## 2018-01-01 DIAGNOSIS — R53.81 DEBILITATED PATIENT: ICD-10-CM

## 2018-01-01 LAB
ALBUMIN SERPL-MCNC: 3.8 G/DL (ref 3.4–5)
ALBUMIN/GLOB SERPL: 0.9 {RATIO} (ref 0.8–1.7)
ALP SERPL-CCNC: 105 U/L (ref 45–117)
ALT SERPL-CCNC: 60 U/L (ref 16–61)
ANION GAP SERPL CALC-SCNC: 3 MMOL/L (ref 3–18)
APPEARANCE UR: CLEAR
APTT PPP: 32 SEC (ref 23–36.4)
AST SERPL-CCNC: 34 U/L (ref 15–37)
ATRIAL RATE: 326 BPM
BACTERIA SPEC CULT: NORMAL
BACTERIA URNS QL MICRO: NEGATIVE /HPF
BASOPHILS # BLD: 0 K/UL (ref 0–0.1)
BASOPHILS NFR BLD: 0 % (ref 0–2)
BILIRUB SERPL-MCNC: 2.1 MG/DL (ref 0.2–1)
BILIRUB UR QL: NEGATIVE
BNP SERPL-MCNC: 1992 PG/ML (ref 0–1800)
BUN SERPL-MCNC: 14 MG/DL (ref 7–18)
BUN/CREAT SERPL: 14 (ref 12–20)
CALCIUM SERPL-MCNC: 9.3 MG/DL (ref 8.5–10.1)
CALCULATED R AXIS, ECG10: 7 DEGREES
CALCULATED T AXIS, ECG11: -13 DEGREES
CHLORIDE SERPL-SCNC: 106 MMOL/L (ref 100–108)
CK MB CFR SERPL CALC: 2.4 % (ref 0–4)
CK MB SERPL-MCNC: 1.3 NG/ML (ref 5–25)
CK SERPL-CCNC: 55 U/L (ref 39–308)
CO2 SERPL-SCNC: 31 MMOL/L (ref 21–32)
COLOR UR: YELLOW
CREAT SERPL-MCNC: 0.97 MG/DL (ref 0.6–1.3)
DIAGNOSIS, 93000: NORMAL
DIFFERENTIAL METHOD BLD: ABNORMAL
EOSINOPHIL # BLD: 0.2 K/UL (ref 0–0.4)
EOSINOPHIL NFR BLD: 2 % (ref 0–5)
EPITH CASTS URNS QL MICRO: NEGATIVE /LPF (ref 0–5)
ERYTHROCYTE [DISTWIDTH] IN BLOOD BY AUTOMATED COUNT: 14.2 % (ref 11.6–14.5)
GLOBULIN SER CALC-MCNC: 4.3 G/DL (ref 2–4)
GLUCOSE SERPL-MCNC: 85 MG/DL (ref 74–99)
GLUCOSE UR STRIP.AUTO-MCNC: NEGATIVE MG/DL
HCT VFR BLD AUTO: 48.2 % (ref 36–48)
HGB BLD-MCNC: 15.9 G/DL (ref 13–16)
HGB UR QL STRIP: NEGATIVE
INR PPP: 1 (ref 0.8–1.2)
KETONES UR QL STRIP.AUTO: ABNORMAL MG/DL
LACTATE BLD-SCNC: 1.66 MMOL/L (ref 0.4–2)
LEUKOCYTE ESTERASE UR QL STRIP.AUTO: ABNORMAL
LYMPHOCYTES # BLD: 0.9 K/UL (ref 0.9–3.6)
LYMPHOCYTES NFR BLD: 13 % (ref 21–52)
MAGNESIUM SERPL-MCNC: 2.2 MG/DL (ref 1.6–2.6)
MCH RBC QN AUTO: 30.7 PG (ref 24–34)
MCHC RBC AUTO-ENTMCNC: 33 G/DL (ref 31–37)
MCV RBC AUTO: 93.1 FL (ref 74–97)
MONOCYTES # BLD: 0.5 K/UL (ref 0.05–1.2)
MONOCYTES NFR BLD: 8 % (ref 3–10)
MUCOUS THREADS URNS QL MICRO: ABNORMAL /LPF
NEUTS SEG # BLD: 5.1 K/UL (ref 1.8–8)
NEUTS SEG NFR BLD: 77 % (ref 40–73)
NITRITE UR QL STRIP.AUTO: NEGATIVE
PH UR STRIP: 6 [PH] (ref 5–8)
PLATELET # BLD AUTO: 169 K/UL (ref 135–420)
PMV BLD AUTO: 10.7 FL (ref 9.2–11.8)
POTASSIUM SERPL-SCNC: 3.9 MMOL/L (ref 3.5–5.5)
PROT SERPL-MCNC: 8.1 G/DL (ref 6.4–8.2)
PROT UR STRIP-MCNC: NEGATIVE MG/DL
PROTHROMBIN TIME: 13.3 SEC (ref 11.5–15.2)
Q-T INTERVAL, ECG07: 394 MS
QRS DURATION, ECG06: 78 MS
QTC CALCULATION (BEZET), ECG08: 418 MS
RBC # BLD AUTO: 5.18 M/UL (ref 4.7–5.5)
RBC #/AREA URNS HPF: ABNORMAL /HPF (ref 0–5)
SERVICE CMNT-IMP: NORMAL
SODIUM SERPL-SCNC: 140 MMOL/L (ref 136–145)
SP GR UR REFRACTOMETRY: 1.02 (ref 1–1.03)
TROPONIN I SERPL-MCNC: <0.02 NG/ML (ref 0–0.04)
UROBILINOGEN UR QL STRIP.AUTO: 1 EU/DL (ref 0.2–1)
VENTRICULAR RATE, ECG03: 68 BPM
WBC # BLD AUTO: 6.7 K/UL (ref 4.6–13.2)
WBC URNS QL MICRO: ABNORMAL /HPF (ref 0–4)

## 2018-01-01 PROCEDURE — 81001 URINALYSIS AUTO W/SCOPE: CPT

## 2018-01-01 PROCEDURE — 93005 ELECTROCARDIOGRAM TRACING: CPT

## 2018-01-01 PROCEDURE — 87086 URINE CULTURE/COLONY COUNT: CPT

## 2018-01-01 PROCEDURE — 96374 THER/PROPH/DIAG INJ IV PUSH: CPT

## 2018-01-01 PROCEDURE — 80053 COMPREHEN METABOLIC PANEL: CPT

## 2018-01-01 PROCEDURE — 85610 PROTHROMBIN TIME: CPT

## 2018-01-01 PROCEDURE — 3331090002 HH PPS REVENUE DEBIT

## 2018-01-01 PROCEDURE — 87040 BLOOD CULTURE FOR BACTERIA: CPT

## 2018-01-01 PROCEDURE — 82553 CREATINE MB FRACTION: CPT

## 2018-01-01 PROCEDURE — 83735 ASSAY OF MAGNESIUM: CPT

## 2018-01-01 PROCEDURE — 99285 EMERGENCY DEPT VISIT HI MDM: CPT

## 2018-01-01 PROCEDURE — 85025 COMPLETE CBC W/AUTO DIFF WBC: CPT

## 2018-01-01 PROCEDURE — 74011250636 HC RX REV CODE- 250/636: Performed by: EMERGENCY MEDICINE

## 2018-01-01 PROCEDURE — 83880 ASSAY OF NATRIURETIC PEPTIDE: CPT

## 2018-01-01 PROCEDURE — 96361 HYDRATE IV INFUSION ADD-ON: CPT

## 2018-01-01 PROCEDURE — 74011000250 HC RX REV CODE- 250: Performed by: EMERGENCY MEDICINE

## 2018-01-01 PROCEDURE — 3331090001 HH PPS REVENUE CREDIT

## 2018-01-01 PROCEDURE — 71046 X-RAY EXAM CHEST 2 VIEWS: CPT

## 2018-01-01 PROCEDURE — 83605 ASSAY OF LACTIC ACID: CPT

## 2018-01-01 PROCEDURE — 85730 THROMBOPLASTIN TIME PARTIAL: CPT

## 2018-01-01 RX ORDER — SODIUM CHLORIDE 0.9 % (FLUSH) 0.9 %
5-10 SYRINGE (ML) INJECTION AS NEEDED
Status: DISCONTINUED | OUTPATIENT
Start: 2018-01-01 | End: 2018-01-01 | Stop reason: HOSPADM

## 2018-01-01 RX ORDER — ATORVASTATIN CALCIUM 40 MG/1
40 TABLET, FILM COATED ORAL DAILY
Qty: 90 TAB | Refills: 3 | Status: SHIPPED | OUTPATIENT
Start: 2018-01-01

## 2018-01-01 RX ORDER — CEFDINIR 300 MG/1
300 CAPSULE ORAL 2 TIMES DAILY
Qty: 20 CAP | Refills: 0 | Status: SHIPPED | OUTPATIENT
Start: 2018-01-01 | End: 2019-01-01 | Stop reason: ALTCHOICE

## 2018-01-01 RX ORDER — DRONABINOL 5 MG/1
10 CAPSULE ORAL 2 TIMES DAILY
Qty: 120 CAP | Refills: 2 | OUTPATIENT
Start: 2018-01-01 | End: 2019-01-01

## 2018-01-01 RX ADMIN — SODIUM CHLORIDE 1000 ML: 900 INJECTION, SOLUTION INTRAVENOUS at 15:26

## 2018-01-01 RX ADMIN — SODIUM CHLORIDE 836 ML: 900 INJECTION, SOLUTION INTRAVENOUS at 17:01

## 2018-01-01 RX ADMIN — CEFEPIME HYDROCHLORIDE 2 G: 2 INJECTION, POWDER, FOR SOLUTION INTRAVENOUS at 15:27

## 2018-01-02 ENCOUNTER — HOME CARE VISIT (OUTPATIENT)
Dept: HOME HEALTH SERVICES | Facility: HOME HEALTH | Age: 83
End: 2018-01-02
Payer: MEDICARE

## 2018-01-02 VITALS
DIASTOLIC BLOOD PRESSURE: 70 MMHG | OXYGEN SATURATION: 96 % | TEMPERATURE: 98.8 F | HEART RATE: 51 BPM | SYSTOLIC BLOOD PRESSURE: 100 MMHG

## 2018-01-02 PROCEDURE — G0157 HHC PT ASSISTANT EA 15: HCPCS

## 2018-01-02 PROCEDURE — 3331090002 HH PPS REVENUE DEBIT

## 2018-01-02 PROCEDURE — 3331090001 HH PPS REVENUE CREDIT

## 2018-01-03 ENCOUNTER — HOME CARE VISIT (OUTPATIENT)
Dept: SCHEDULING | Facility: HOME HEALTH | Age: 83
End: 2018-01-03
Payer: MEDICARE

## 2018-01-03 VITALS — DIASTOLIC BLOOD PRESSURE: 90 MMHG | HEART RATE: 66 BPM | OXYGEN SATURATION: 94 % | SYSTOLIC BLOOD PRESSURE: 120 MMHG

## 2018-01-03 PROCEDURE — G0157 HHC PT ASSISTANT EA 15: HCPCS

## 2018-01-03 PROCEDURE — 3331090002 HH PPS REVENUE DEBIT

## 2018-01-03 PROCEDURE — 3331090001 HH PPS REVENUE CREDIT

## 2018-01-04 ENCOUNTER — HOME CARE VISIT (OUTPATIENT)
Dept: HOME HEALTH SERVICES | Facility: HOME HEALTH | Age: 83
End: 2018-01-04
Payer: MEDICARE

## 2018-01-04 PROCEDURE — 3331090002 HH PPS REVENUE DEBIT

## 2018-01-04 PROCEDURE — 3331090001 HH PPS REVENUE CREDIT

## 2018-01-05 PROCEDURE — 3331090001 HH PPS REVENUE CREDIT

## 2018-01-05 PROCEDURE — 3331090002 HH PPS REVENUE DEBIT

## 2018-01-06 PROCEDURE — 3331090001 HH PPS REVENUE CREDIT

## 2018-01-06 PROCEDURE — 3331090002 HH PPS REVENUE DEBIT

## 2018-01-07 PROCEDURE — 3331090001 HH PPS REVENUE CREDIT

## 2018-01-07 PROCEDURE — 3331090002 HH PPS REVENUE DEBIT

## 2018-01-08 PROCEDURE — 3331090001 HH PPS REVENUE CREDIT

## 2018-01-08 PROCEDURE — 3331090002 HH PPS REVENUE DEBIT

## 2018-01-09 ENCOUNTER — HOME CARE VISIT (OUTPATIENT)
Dept: SCHEDULING | Facility: HOME HEALTH | Age: 83
End: 2018-01-09
Payer: MEDICARE

## 2018-01-09 VITALS
TEMPERATURE: 99.1 F | OXYGEN SATURATION: 98 % | SYSTOLIC BLOOD PRESSURE: 118 MMHG | DIASTOLIC BLOOD PRESSURE: 78 MMHG | HEART RATE: 91 BPM

## 2018-01-09 PROCEDURE — G0153 HHCP-SVS OF S/L PATH,EA 15MN: HCPCS

## 2018-01-09 PROCEDURE — G0157 HHC PT ASSISTANT EA 15: HCPCS

## 2018-01-09 PROCEDURE — 3331090001 HH PPS REVENUE CREDIT

## 2018-01-09 PROCEDURE — 3331090002 HH PPS REVENUE DEBIT

## 2018-01-10 PROCEDURE — 3331090002 HH PPS REVENUE DEBIT

## 2018-01-10 PROCEDURE — 3331090001 HH PPS REVENUE CREDIT

## 2018-01-11 ENCOUNTER — HOME CARE VISIT (OUTPATIENT)
Dept: SCHEDULING | Facility: HOME HEALTH | Age: 83
End: 2018-01-11
Payer: MEDICARE

## 2018-01-11 ENCOUNTER — HOME CARE VISIT (OUTPATIENT)
Dept: HOME HEALTH SERVICES | Facility: HOME HEALTH | Age: 83
End: 2018-01-11
Payer: MEDICARE

## 2018-01-11 VITALS
DIASTOLIC BLOOD PRESSURE: 78 MMHG | SYSTOLIC BLOOD PRESSURE: 118 MMHG | HEART RATE: 87 BPM | TEMPERATURE: 99.1 F | OXYGEN SATURATION: 95 %

## 2018-01-11 PROCEDURE — 3331090002 HH PPS REVENUE DEBIT

## 2018-01-11 PROCEDURE — G0151 HHCP-SERV OF PT,EA 15 MIN: HCPCS

## 2018-01-11 PROCEDURE — 3331090001 HH PPS REVENUE CREDIT

## 2018-01-12 ENCOUNTER — TELEPHONE (OUTPATIENT)
Dept: INTERNAL MEDICINE CLINIC | Age: 83
End: 2018-01-12

## 2018-01-12 PROCEDURE — 3331090001 HH PPS REVENUE CREDIT

## 2018-01-12 PROCEDURE — 3331090002 HH PPS REVENUE DEBIT

## 2018-01-12 NOTE — TELEPHONE ENCOUNTER
Called and spoke to Ms. Fletcher Alcaraz from [facility] where patient is being taken care of and relayed the message to her that Dr. Dannielle Lazcano is ok with giving her verbal order for an additional visit for speech therapy. Ms. Fletcher Alcaraz verbalized understanding.

## 2018-01-12 NOTE — TELEPHONE ENCOUNTER
Nurse calling asking for verbal order for 1 additional visit for speech therapy. Says a session was missed due to weather.

## 2018-01-13 PROCEDURE — 3331090001 HH PPS REVENUE CREDIT

## 2018-01-13 PROCEDURE — 3331090002 HH PPS REVENUE DEBIT

## 2018-01-14 PROCEDURE — 3331090001 HH PPS REVENUE CREDIT

## 2018-01-14 PROCEDURE — 3331090002 HH PPS REVENUE DEBIT

## 2018-01-15 PROCEDURE — 3331090001 HH PPS REVENUE CREDIT

## 2018-01-15 PROCEDURE — 3331090002 HH PPS REVENUE DEBIT

## 2018-01-16 ENCOUNTER — HOME CARE VISIT (OUTPATIENT)
Dept: SCHEDULING | Facility: HOME HEALTH | Age: 83
End: 2018-01-16
Payer: MEDICARE

## 2018-01-16 VITALS
OXYGEN SATURATION: 96 % | TEMPERATURE: 98.6 F | HEART RATE: 69 BPM | DIASTOLIC BLOOD PRESSURE: 70 MMHG | SYSTOLIC BLOOD PRESSURE: 100 MMHG

## 2018-01-16 PROCEDURE — G0153 HHCP-SVS OF S/L PATH,EA 15MN: HCPCS

## 2018-01-16 PROCEDURE — 3331090002 HH PPS REVENUE DEBIT

## 2018-01-16 PROCEDURE — 3331090001 HH PPS REVENUE CREDIT

## 2018-01-16 PROCEDURE — G0157 HHC PT ASSISTANT EA 15: HCPCS

## 2018-01-17 PROCEDURE — 3331090001 HH PPS REVENUE CREDIT

## 2018-01-17 PROCEDURE — 3331090002 HH PPS REVENUE DEBIT

## 2018-01-18 ENCOUNTER — HOME CARE VISIT (OUTPATIENT)
Dept: HOME HEALTH SERVICES | Facility: HOME HEALTH | Age: 83
End: 2018-01-18
Payer: MEDICARE

## 2018-01-18 ENCOUNTER — HOME CARE VISIT (OUTPATIENT)
Dept: SCHEDULING | Facility: HOME HEALTH | Age: 83
End: 2018-01-18
Payer: MEDICARE

## 2018-01-18 VITALS
SYSTOLIC BLOOD PRESSURE: 110 MMHG | OXYGEN SATURATION: 94 % | DIASTOLIC BLOOD PRESSURE: 70 MMHG | HEART RATE: 53 BPM | TEMPERATURE: 98.4 F

## 2018-01-18 PROCEDURE — 3331090001 HH PPS REVENUE CREDIT

## 2018-01-18 PROCEDURE — G0151 HHCP-SERV OF PT,EA 15 MIN: HCPCS

## 2018-01-18 PROCEDURE — 3331090002 HH PPS REVENUE DEBIT

## 2018-02-02 ENCOUNTER — TELEPHONE (OUTPATIENT)
Dept: INTERNAL MEDICINE CLINIC | Age: 83
End: 2018-02-02

## 2018-02-02 NOTE — TELEPHONE ENCOUNTER
[facility] is asking for clinical note faxed explaining why pt needs cushion     Please fax to [phone]

## 2018-02-09 ENCOUNTER — HOSPITAL ENCOUNTER (OUTPATIENT)
Dept: GENERAL RADIOLOGY | Age: 83
Discharge: HOME | End: 2018-02-09
Payer: MEDICARE

## 2018-02-09 ENCOUNTER — OFFICE VISIT (OUTPATIENT)
Dept: INTERNAL MEDICINE CLINIC | Age: 83
End: 2018-02-09

## 2018-02-09 ENCOUNTER — TELEPHONE (OUTPATIENT)
Dept: INTERNAL MEDICINE CLINIC | Age: 83
End: 2018-02-09

## 2018-02-09 VITALS
RESPIRATION RATE: 14 BRPM | DIASTOLIC BLOOD PRESSURE: 66 MMHG | WEIGHT: 128 LBS | SYSTOLIC BLOOD PRESSURE: 110 MMHG | HEART RATE: 76 BPM | OXYGEN SATURATION: 98 % | HEIGHT: 66 IN | BODY MASS INDEX: 20.57 KG/M2

## 2018-02-09 DIAGNOSIS — R05.9 COUGH: ICD-10-CM

## 2018-02-09 DIAGNOSIS — R63.0 LACK OF APPETITE: ICD-10-CM

## 2018-02-09 DIAGNOSIS — R53.83 LETHARGY: ICD-10-CM

## 2018-02-09 DIAGNOSIS — R05.9 COUGH: Primary | ICD-10-CM

## 2018-02-09 PROCEDURE — 71046 X-RAY EXAM CHEST 2 VIEWS: CPT

## 2018-02-09 NOTE — PROGRESS NOTES
1. Have you been to the ER, urgent care clinic or hospitalized since your last visit? NO.     2. Have you seen or consulted any other health care providers outside of the [address] [place] [address] since your last visit (Include any pap smears or colon screening)? NO      Do you have an Advanced Directive?  YES

## 2018-02-09 NOTE — PROGRESS NOTES
Imtiaz Ceja is a 80 y.o.  male and presents with    Chief Complaint   Patient presents with    Cold Symptoms     Patient here for dry productive cough, stopping eating, runny nose, weakness/fatigue. x 1 week        Subjective:  HPI   Mr. Brent Chandler is present today with his daughter, Melecio Mathis, and caregiver with complaints that he stopped eating, is lethargic,cough nonproductive. Reports symptoms started around Monday. Positive sick contacts. Denies cp, fever, chills, night sweats, no signs of aspiration, choking. No OTC therapy tried. Additional Concerns: none     ROS   Review of Systems   Constitutional: Negative for chills and fever. HENT: Negative. Eyes: Negative. Respiratory: Positive for cough. Negative for hemoptysis, sputum production, shortness of breath and wheezing. Cardiovascular: Negative for chest pain. Gastrointestinal: Negative for abdominal pain, constipation, diarrhea, nausea and vomiting. Skin: Negative. Neurological: Positive for weakness. Allergies   Allergen Reactions    Latex Other (comments)     Daughter is not sure why patient is allergic    Bacitracin Rash     blisters    Sulfa (Sulfonamide Antibiotics) Unknown (comments)    Antihistamines - Alkylamine Unknown (comments)       Current Outpatient Prescriptions   Medication Sig Dispense Refill    ipratropium (ATROVENT) 0.03 % nasal spray 2 Sprays by Both Nostrils route two (2) times a day. 2-3x/day      Omega-3-DHA-EPA-Fish Oil (FISH OIL) 1,000 mg (120 mg-180 mg) cap Take 1 Cap by mouth daily.  dronabinol (MARINOL) 5 mg capsule Take 5 mg by mouth two (2) times a day.  cyanocobalamin (VITAMIN B-12) 2,000 mcg TbER Take 2,000 mcg by mouth daily.  DENTA 5000 PLUS 1.1 % crea Take  by mouth two (2) times a day. Indications: DENTAL PLAQUE PREVENTION, GINGIVITIS      MULTIVIT-MIN/FA/LYCOPEN/LUTEIN (CENTRUM SILVER ULTRA MEN'S PO) Take 1 Tab by mouth daily.       B.infantis-B.ani-B.long-B.bifi (PROBIOTIC 4X) 10-15 mg TbEC Take 1 Tab by mouth daily.  co-enzyme Q-10 (CO Q-10) 100 mg capsule Take 200 mg by mouth daily.  ASCORBIC ACID/MULTIVIT-MIN (EMERGEN-C PO) Take 1 Tab by mouth daily.  memantine (NAMENDA) 5 mg tablet TAKE 1 TAB BY MOUTH DAILY. 30 Tab 5    atorvastatin (LIPITOR) 40 mg tablet TAKE ONE TABLET BY MOUTH ONE TIME DAILY 90 Tab 3    clopidogrel (PLAVIX) 75 mg tab Take 1 Tab by mouth daily. 30 Tab 11    donepezil (ARICEPT) 10 mg tablet Take 1 Tab by mouth nightly. 90 Tab 3    nitroglycerin (NITROSTAT) 0.4 mg SL tablet 1 tab subling every 5 min for chest pain  Maximum 3 doses for any 1 episode 25 Tab 5    Cholecalciferol, Vitamin D3, (VITAMIN D3) 1,000 unit cap Take 1,000 Units by mouth daily.  omega-3 fatty acids-vitamin e (FISH OIL) 1,000 mg Cap Take 1 Cap by mouth daily.  aspirin 81 mg tablet Take 81 mg by mouth daily. Social History     Social History    Marital status:      Spouse name: N/A    Number of children: N/A    Years of education: N/A     Occupational History    Not on file. Social History Main Topics    Smoking status: Former Smoker    Smokeless tobacco: Never Used    Alcohol use No    Drug use: No    Sexual activity: No     Other Topics Concern    Not on file     Social History Narrative       Past Medical History:   Diagnosis Date    Acute coronary syndrome ([facility] [address] 12/26/2011    PCI of LAD at [facility] in [place].    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       Past Surgical History:   Procedure Laterality Date    CARDIAC SURG PROCEDURE UNLIST      HX HEENT      tonsillectomy       Family History   Problem Relation Age of Onset    Diabetes Sister     Stroke Sister        Objective:  Vitals:    02/09/18 1021   BP: 110/66   Pulse: 76 Resp: 14   SpO2: 98%   Weight: 128 lb (58.1 kg)   Height: 5' 6\" (1.676 m)   PainSc:   0 - No pain       LABS   Results for orders placed or performed during the hospital encounter of 12/14/17   CBC WITH AUTOMATED DIFF   Result Value Ref Range    WBC 8.5 4.6 - 13.2 K/uL    RBC 4.97 4.70 - 5.50 M/uL    HGB 15.5 13.0 - 16.0 g/dL    HCT 46.8 36.0 - 48.0 %    MCV 94.2 74.0 - 97.0 FL    MCH 31.2 24.0 - 34.0 PG    MCHC 33.1 31.0 - 37.0 g/dL    RDW 13.8 11.6 - 14.5 %    PLATELET 498 605 - 159 K/uL    MPV 10.8 9.2 - 11.8 FL    NEUTROPHILS 87 (H) 40 - 73 %    LYMPHOCYTES 7 (L) 21 - 52 %    MONOCYTES 6 3 - 10 %    EOSINOPHILS 0 0 - 5 %    BASOPHILS 0 0 - 2 %    ABS. NEUTROPHILS 7.4 1.8 - 8.0 K/UL    ABS. LYMPHOCYTES 0.6 (L) 0.9 - 3.6 K/UL    ABS. MONOCYTES 0.5 0.05 - 1.2 K/UL    ABS. EOSINOPHILS 0.0 0.0 - 0.4 K/UL    ABS. BASOPHILS 0.0 0.0 - 0.06 K/UL    DF AUTOMATED     HEMOGLOBIN A1C WITH EAG   Result Value Ref Range    Hemoglobin A1c 5.4 4.2 - 5.6 %    Est. average glucose 311 mg/dL   METABOLIC PANEL, COMPREHENSIVE   Result Value Ref Range    Sodium 141 136 - 145 mmol/L    Potassium 3.4 (L) 3.5 - 5.5 mmol/L    Chloride 106 100 - 108 mmol/L    CO2 24 21 - 32 mmol/L    Anion gap 11 3.0 - 18 mmol/L    Glucose 198 (H) 74 - 99 mg/dL    BUN 14 7.0 - 18 MG/DL    Creatinine 1.21 0.6 - 1.3 MG/DL    BUN/Creatinine ratio 12 12 - 20      GFR est AA >60 >60 ml/min/1.73m2    GFR est non-AA 57 (L) >60 ml/min/1.73m2    Calcium 8.4 (L) 8.5 - 10.1 MG/DL    Bilirubin, total 2.4 (H) 0.2 - 1.0 MG/DL    ALT (SGPT) 23 16 - 61 U/L    AST (SGOT) 18 15 - 37 U/L    Alk. phosphatase 72 45 - 117 U/L    Protein, total 6.7 6.4 - 8.2 g/dL    Albumin 3.5 3.4 - 5.0 g/dL    Globulin 3.2 2.0 - 4.0 g/dL    A-G Ratio 1.1 0.8 - 1.7     TSH 3RD GENERATION   Result Value Ref Range    TSH 1.31 0.36 - 3.74 uIU/mL       TESTS  none    PE  Physical Exam   Constitutional: No distress.    Thin, frail, sleeping during appointment   HENT:   Head: Normocephalic and atraumatic. Eyes: Conjunctivae and EOM are normal. Right eye exhibits no discharge. Left eye exhibits no discharge. Cardiovascular: Normal rate, regular rhythm and intact distal pulses. Murmur heard. Pulmonary/Chest: Breath sounds normal. No respiratory distress. He has no wheezes. He has no rales. He exhibits no tenderness. Cooperative with deep breathing, followed commands   Abdominal: Soft. Bowel sounds are normal. He exhibits no distension. There is no tenderness. Skin: Skin is warm and dry. No rash noted. He is not diaphoretic. No erythema. No pallor. Psychiatric: He has a normal mood and affect. His behavior is normal.         Assessment/Plan:    1. Upper respiratory syndrome-viral- Chest Xray negative today, previous diagnosis of pneumonia 12/14/17 reported as resolved, spoke with Yony Aj, daughter, provided results. Symptomatic care. Hydrate and rest. Monitor and call if symptoms worsen. Lab review: no lab studies available for review at time of visit    Today's Visit:   Diagnoses and all orders for this visit:    1. Cough  -     XR CHEST PA LAT; Future    2. Lethargy  -     XR CHEST PA LAT; Future    3. Lack of appetite  -     XR CHEST PA LAT; Future      Health Maintenance: Not addressed today. I have discussed the diagnosis with the patient and the intended plan as seen in the above orders. The patient has received an after-visit summary and questions were answered concerning future plans. I have discussed medication side effects and warnings with the patient as well. I have reviewed the plan of care with the patient, accepted their input and they are in agreement with the treatment goals. Follow-up Disposition: Not on File   More than 1/2 of this 25 minute visit was spent in counseling and coordination of care, as described above.     CHARISSA Renteria  Internist of [address], [facility] [address]  Phone: [phone]  Fax: [phone]
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
## 2018-02-09 NOTE — MR AVS SNAPSHOT
[address] [facility] 
 
 
 [address], [address] [place] [address] [facility] 
[phone] Patient: Petr Amador MRN: [id number]:[id number] Visit Information Date & Time Provider Department Dept. Phone Encounter #  
 2/9/2018 10:15 AM Mery Carbajal NP [facility]  Your Appointments 2/23/2018  2:15 PM  
Office Visit with Mery Carbajal NP [facility]) Appt Note: 2 week f/u  
 [address] [facility], [address] [place] [address] [place]  
  
   
 [address], [facility]  
  
    
 6/21/2018  8:05 AM  
LAB with [facility] NURSE VISIT [facility]) Appt Note: lab  
 [address], [address] [place] [address] [place]  
  
   
 [address] [facility]  
  
    
 6/28/2018 10:30 AM  
Office Visit with Aye Abdi MD  
[facility] Appt Note: 6 month f/u  
 [address] [facility], [address] [place] [address] [place]  
  
   
 [address] [facility], [facility] Upcoming Health Maintenance Date Due  
 GLAUCOMA SCREENING Q2Y 11/28/2018 MEDICARE YEARLY EXAM 12/27/2018 DTaP/Tdap/Td series (2 - Td) 11/20/2026 Allergies as of 2/9/2018  Review Complete On: 2/9/2018 By: Brittanie Patch Severity Noted Reaction Type Reactions Latex Medium 12/26/2017    Other (comments) Daughter is not sure why patient is allergic Bacitracin High 12/26/2017    Rash  
 blisters Sulfa (Sulfonamide Antibiotics) High 07/07/2011    Unknown (comments) Antihistamines - Alkylamine  07/07/2011    Unknown (comments) Current Immunizations  Reviewed on 11/10/2015 Name Date Influenza High Dose Vaccine PF 9/23/2017, 11/10/2015  2:39 PM, 10/6/2014 Influenza Vaccine Whole 12/30/2009 Pneumococcal Conjugate (PCV-13) 11/10/2015  2:41 PM  
 TD Vaccine 12/16/2003 Tdap 11/10/2015  2:54 PM  
 ZZZ-RETIRED (DO NOT USE) Pneumococcal Vaccine (Unspecified Type) 12/23/2005 Not reviewed this visit You Were Diagnosed With   
  
 Codes Comments Cough    -  Primary ICD-10-CM: T11 ICD-9-CM: 786.2 Lethargy     ICD-10-CM: R53.83 ICD-9-CM: 780.79 Lack of appetite     ICD-10-CM: R63.0 ICD-9-CM: 726. 0 Vitals BP Pulse Resp Height(growth percentile) Weight(growth percentile) SpO2  
 110/66 (BP 1 Location: Left arm, BP Patient Position: Sitting) 76 14 5' 6\" (1.676 m) 128 lb (58.1 kg) 98% BMI Smoking Status 20.66 kg/m2 Former Smoker Vitals History BMI and BSA Data Body Mass Index Body Surface Area  
 20.66 kg/m 2 1.64 m 2 Preferred Pharmacy Pharmacy Name Phone [address] [phone] Your Updated Medication List  
  
   
This list is accurate as of: 2/9/18 11:15 AM.  Always use your most recent med list.  
  
  
  
  
 aspirin 81 mg tablet Take 81 mg by mouth daily. atorvastatin 40 mg tablet Commonly known as:  LIPITOR  
TAKE ONE TABLET BY MOUTH ONE TIME DAILY CENTRUM SILVER ULTRA MEN'S PO Take 1 Tab by mouth daily. clopidogrel 75 mg Tab Commonly known as:  PLAVIX Take 1 Tab by mouth daily. CO Q-10 100 mg capsule Generic drug:  co-enzyme Q-10 Take 200 mg by mouth daily. DENTA 5000 PLUS 1.1 % Crea Generic drug:  fluoride (sodium) Take  by mouth two (2) times a day. Indications: DENTAL PLAQUE PREVENTION, GINGIVITIS  
  
 donepezil 10 mg tablet Commonly known as:  ARICEPT Take 1 Tab by mouth nightly. dronabinol 5 mg capsule Commonly known as:  Karene Knock Take 5 mg by mouth two (2) times a day. EMERGEN-C PO Take 1 Tab by mouth daily. FISH OIL 1,000 mg (120 mg-180 mg) Cap Generic drug:  Omega-3-DHA-EPA-Fish Oil Take 1 Cap by mouth daily. FISH OIL 1,000 mg Cap Generic drug:  omega-3 fatty acids-vitamin e Take 1 Cap by mouth daily. ipratropium 0.03 % nasal spray Commonly known as:  ATROVENT  
2 Sprays by Both Nostrils route two (2) times a day. 2-3x/day  
  
 memantine 5 mg tablet Commonly known as:  Cori Maria R TAKE 1 TAB BY MOUTH DAILY. nitroglycerin 0.4 mg SL tablet Commonly known as:  NITROSTAT  
1 tab subling every 5 min for chest pain  Maximum 3 doses for any 1 episode PROBIOTIC 4X 10-15 mg Tbec Generic drug:  B.infantis-B.ani-B.long-B.bifi Take 1 Tab by mouth daily. VITAMIN B-12 2,000 mcg Tber Generic drug:  cyanocobalamin Take 2,000 mcg by mouth daily. VITAMIN D3 1,000 unit Cap Generic drug:  cholecalciferol Take 1,000 Units by mouth daily. To-Do List   
 02/09/2018 Imaging:  XR CHEST PA LAT Introducing [facility] & HEALTH SERVICES! Hussein Robert introduces [employer] patient portal. Now you can access parts of your medical record, email your doctor's office, and request medication refills online. 1. In your internet browser, go to [email or web address] 2. Click on the First Time User? Click Here link in the Sign In box. You will see the New Member Sign Up page. 3. Enter your [employer] Access Code exactly as it appears below. You will not need to use this code after youve completed the sign-up process. If you do not sign up before the expiration date, you must request a new code. · [employer] Access Code: [email or web address] Expires: 3/14/2018 10:50 AM 
 
4. Enter the last four digits of your Social Security Number (xxxx) and Date of Birth (mm/dd/yyyy) as indicated and click Submit. You will be taken to the next sign-up page. 5. Create a [employer] ID. This will be your [employer] login ID and cannot be changed, so think of one that is secure and easy to remember. 6. Create a [employer] password. You can change your password at any time. 7. Enter your Password Reset Question and Answer. This can be used at a later time if you forget your password. 8. Enter your e-mail address. You will receive e-mail notification when new information is available in [address] 9. Click Sign Up. You can now view and download portions of your medical record. 10. Click the Download Summary menu link to download a portable copy of your medical information. If you have questions, please visit the Frequently Asked Questions section of the [employer] website. Remember, [employer] is NOT to be used for urgent needs. For medical emergencies, dial 911. Now available from your iPhone and Android! Please provide this summary of care documentation to your next provider. Your primary care clinician is listed as Manuel Thompson. If you have any questions after today's visit, please call [phone].

## 2018-02-16 NOTE — TELEPHONE ENCOUNTER
[facility] medical calling again. Says they need the note to be signed and dated by the doctor. They will not accept the electronic signature.

## 2018-02-23 ENCOUNTER — OFFICE VISIT (OUTPATIENT)
Dept: INTERNAL MEDICINE CLINIC | Age: 83
End: 2018-02-23

## 2018-02-23 VITALS
HEIGHT: 66 IN | TEMPERATURE: 98.1 F | SYSTOLIC BLOOD PRESSURE: 114 MMHG | RESPIRATION RATE: 14 BRPM | DIASTOLIC BLOOD PRESSURE: 76 MMHG | WEIGHT: 133 LBS | BODY MASS INDEX: 21.38 KG/M2 | HEART RATE: 58 BPM | OXYGEN SATURATION: 96 %

## 2018-02-23 DIAGNOSIS — Z09 FOLLOW-UP EXAMINATION: Primary | ICD-10-CM

## 2018-02-23 DIAGNOSIS — R05.9 COUGH: ICD-10-CM

## 2018-02-23 NOTE — PROGRESS NOTES
1. Have you been to the ER, urgent care clinic or hospitalized since your last visit? NO.     2. Have you seen or consulted any other health care providers outside of the [facility] since your last visit (Include any pap smears or colon screening)?  NO

## 2018-02-23 NOTE — PROGRESS NOTES
Nella Mcardle is a 80 y.o.  male and presents with    Chief Complaint   Patient presents with    Cold Symptoms     follow up- still coughing at night        Subjective:  HPI   Mr. Lilly Desir presents today for follow up to lethargy, cough, lack of appetite. He is present with the caregiver today. She reports improved, mild dry cough intermittently at night. Additional Concerns: Needs a refill on Dronabinol and still has not received cushion for wheelchair. ROS   Review of Systems   Constitutional: Negative. HENT: Negative. Eyes: Negative. Respiratory: Negative. Cardiovascular: Negative. Gastrointestinal: Negative. Genitourinary: Negative. Musculoskeletal: Negative. Skin: Negative. Neurological: Negative. Allergies   Allergen Reactions    Latex Other (comments)     Daughter is not sure why patient is allergic    Bacitracin Rash     blisters    Sulfa (Sulfonamide Antibiotics) Unknown (comments)    Antihistamines - Alkylamine Unknown (comments)       Current Outpatient Prescriptions   Medication Sig Dispense Refill    ipratropium (ATROVENT) 0.03 % nasal spray 2 Sprays by Both Nostrils route two (2) times a day. 2-3x/day      Omega-3-DHA-EPA-Fish Oil (FISH OIL) 1,000 mg (120 mg-180 mg) cap Take 1 Cap by mouth daily.  dronabinol (MARINOL) 5 mg capsule Take 5 mg by mouth two (2) times a day.  cyanocobalamin (VITAMIN B-12) 2,000 mcg TbER Take 2,000 mcg by mouth daily.  DENTA 5000 PLUS 1.1 % crea Take  by mouth two (2) times a day. Indications: DENTAL PLAQUE PREVENTION, GINGIVITIS      MULTIVIT-MIN/FA/LYCOPEN/LUTEIN (CENTRUM SILVER ULTRA MEN'S PO) Take 1 Tab by mouth daily.  B.infantis-B.ani-B.long-B.bifi (PROBIOTIC 4X) 10-15 mg TbEC Take 1 Tab by mouth daily.  co-enzyme Q-10 (CO Q-10) 100 mg capsule Take 200 mg by mouth daily.  ASCORBIC ACID/MULTIVIT-MIN (EMERGEN-C PO) Take 1 Tab by mouth daily.       memantine (NAMENDA) 5 mg tablet TAKE 1 TAB BY MOUTH DAILY. 30 Tab 5    atorvastatin (LIPITOR) 40 mg tablet TAKE ONE TABLET BY MOUTH ONE TIME DAILY 90 Tab 3    clopidogrel (PLAVIX) 75 mg tab Take 1 Tab by mouth daily. 30 Tab 11    donepezil (ARICEPT) 10 mg tablet Take 1 Tab by mouth nightly. 90 Tab 3    nitroglycerin (NITROSTAT) 0.4 mg SL tablet 1 tab subling every 5 min for chest pain  Maximum 3 doses for any 1 episode 25 Tab 5    Cholecalciferol, Vitamin D3, (VITAMIN D3) 1,000 unit cap Take 1,000 Units by mouth daily.  omega-3 fatty acids-vitamin e (FISH OIL) 1,000 mg Cap Take 1 Cap by mouth daily.  aspirin 81 mg tablet Take 81 mg by mouth daily. Social History     Social History    Marital status:      Spouse name: N/A    Number of children: N/A    Years of education: N/A     Occupational History    Not on file. Social History Main Topics    Smoking status: Former Smoker    Smokeless tobacco: Never Used    Alcohol use No    Drug use: No    Sexual activity: No     Other Topics Concern    Not on file     Social History Narrative       Past Medical History:   Diagnosis Date    Acute coronary syndrome ([address] 12/26/2011    PCI of LAD at [facility] [place].    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       Past Surgical History:   Procedure Laterality Date    CARDIAC SURG PROCEDURE UNLIST      HX HEENT      tonsillectomy       Family History   Problem Relation Age of Onset    Diabetes Sister     Stroke Sister        Objective:  Vitals:    02/23/18 1358   BP: 114/76   Pulse: (!) 58   Resp: 14   Temp: 98.1 °F (36.7 °C)   TempSrc: Oral   SpO2: 96%   Weight: 133 lb (60.3 kg)   Height: 5' 6\" (1.676 m)   PainSc:   0 - No pain       LABS   Results for orders placed or performed during the hospital encounter of 12/14/17   CBC WITH AUTOMATED DIFF   Result Value Ref Range    WBC 8.5 4.6 - 13.2 K/uL    RBC 4.97 4.70 - 5.50 M/uL    HGB 15.5 13.0 - 16.0 g/dL    HCT 46.8 36.0 - 48.0 %    MCV 94.2 74.0 - 97.0 FL    MCH 31.2 24.0 - 34.0 PG    MCHC 33.1 31.0 - 37.0 g/dL    RDW 13.8 11.6 - 14.5 %    PLATELET 889 035 - 656 K/uL    MPV 10.8 9.2 - 11.8 FL    NEUTROPHILS 87 (H) 40 - 73 %    LYMPHOCYTES 7 (L) 21 - 52 %    MONOCYTES 6 3 - 10 %    EOSINOPHILS 0 0 - 5 %    BASOPHILS 0 0 - 2 %    ABS. NEUTROPHILS 7.4 1.8 - 8.0 K/UL    ABS. LYMPHOCYTES 0.6 (L) 0.9 - 3.6 K/UL    ABS. MONOCYTES 0.5 0.05 - 1.2 K/UL    ABS. EOSINOPHILS 0.0 0.0 - 0.4 K/UL    ABS. BASOPHILS 0.0 0.0 - 0.06 K/UL    DF AUTOMATED     HEMOGLOBIN A1C WITH EAG   Result Value Ref Range    Hemoglobin A1c 5.4 4.2 - 5.6 %    Est. average glucose 428 mg/dL   METABOLIC PANEL, COMPREHENSIVE   Result Value Ref Range    Sodium 141 136 - 145 mmol/L    Potassium 3.4 (L) 3.5 - 5.5 mmol/L    Chloride 106 100 - 108 mmol/L    CO2 24 21 - 32 mmol/L    Anion gap 11 3.0 - 18 mmol/L    Glucose 198 (H) 74 - 99 mg/dL    BUN 14 7.0 - 18 MG/DL    Creatinine 1.21 0.6 - 1.3 MG/DL    BUN/Creatinine ratio 12 12 - 20      GFR est AA >60 >60 ml/min/1.73m2    GFR est non-AA 57 (L) >60 ml/min/1.73m2    Calcium 8.4 (L) 8.5 - 10.1 MG/DL    Bilirubin, total 2.4 (H) 0.2 - 1.0 MG/DL    ALT (SGPT) 23 16 - 61 U/L    AST (SGOT) 18 15 - 37 U/L    Alk. phosphatase 72 45 - 117 U/L    Protein, total 6.7 6.4 - 8.2 g/dL    Albumin 3.5 3.4 - 5.0 g/dL    Globulin 3.2 2.0 - 4.0 g/dL    A-G Ratio 1.1 0.8 - 1.7     TSH 3RD GENERATION   Result Value Ref Range    TSH 1.31 0.36 - 3.74 uIU/mL       TESTS  none    PE  Physical Exam   Constitutional: He is oriented to person, place, and time. He appears well-developed and well-nourished. No distress. HENT:   Head: Normocephalic and atraumatic. Eyes: Conjunctivae and EOM are normal.   Neck: Normal range of motion. Neck supple.    Cardiovascular: Normal rate, regular rhythm, normal heart sounds and intact distal pulses. Pulmonary/Chest: Effort normal and breath sounds normal. No respiratory distress. He has no wheezes. He has no rales. He exhibits no tenderness. Abdominal: Soft. Bowel sounds are normal.   Musculoskeletal: Normal range of motion. Neurological: He is alert and oriented to person, place, and time. Skin: Skin is warm and dry. He is not diaphoretic. Psychiatric: He has a normal mood and affect. His behavior is normal. Judgment and thought content normal.       Assessment/Plan:    1. Follow up- No change to plan of care. Patient feeling improved. Dronabinol needs refill, not prescribed by us, discussed to call prescribing provider however if meets resistance then let us know and we will take care of the refill as she reports improvement. Provided number to company for seat cushion as she was contacted in the past and told was to be delivered however has not received, call if meets resistance. Lab review: no lab studies available for review at time of visit    Today's Visit:   Diagnoses and all orders for this visit:    1. Follow-up examination    2. Cough          Health Maintenance: Deferred to PCP. I have discussed the diagnosis with the patient and the intended plan as seen in the above orders. The patient has received an after-visit summary and questions were answered concerning future plans. I have discussed medication side effects and warnings with the patient as well. I have reviewed the plan of care with the patient, accepted their input and they are in agreement with the treatment goals. Follow-up Disposition: Not on File   More than 1/2 of this 15 minute visit was spent in counseling and coordination of care, as described above.     CHARISSA Armstrong  [facility] [address], [address] [place] [address].  Phone: [phone]  Fax: [phone]

## 2018-04-20 RX ORDER — CLOPIDOGREL BISULFATE 75 MG/1
TABLET ORAL
Qty: 30 TAB | Refills: 11 | Status: SHIPPED | OUTPATIENT
Start: 2018-04-20 | End: 2018-06-27 | Stop reason: SDUPTHER

## 2018-04-26 RX ORDER — MEMANTINE HYDROCHLORIDE 5 MG/1
TABLET ORAL
Qty: 30 TAB | Refills: 5 | Status: SHIPPED | OUTPATIENT
Start: 2018-04-26 | End: 2018-08-23 | Stop reason: SDUPTHER

## 2018-05-30 RX ORDER — DONEPEZIL HYDROCHLORIDE 10 MG/1
TABLET, FILM COATED ORAL
Qty: 90 TAB | Refills: 3 | Status: SHIPPED | OUTPATIENT
Start: 2018-05-30 | End: 2019-01-01 | Stop reason: SDUPTHER

## 2018-06-27 RX ORDER — CLOPIDOGREL BISULFATE 75 MG/1
TABLET ORAL
Qty: 90 TAB | Refills: 3 | Status: SHIPPED | OUTPATIENT
Start: 2018-06-27 | End: 2019-01-01

## 2018-06-28 ENCOUNTER — APPOINTMENT (OUTPATIENT)
Dept: INTERNAL MEDICINE CLINIC | Age: 83
End: 2018-06-28

## 2018-06-28 ENCOUNTER — HOSPITAL ENCOUNTER (OUTPATIENT)
Dept: LAB | Age: 83
Discharge: HOME | End: 2018-06-28
Payer: MEDICARE

## 2018-06-28 DIAGNOSIS — Z91.81 RISK FOR FALLS: ICD-10-CM

## 2018-06-28 DIAGNOSIS — R53.81 DEBILITATED PATIENT: ICD-10-CM

## 2018-06-28 DIAGNOSIS — G30.1 LATE ONSET ALZHEIMER'S DISEASE WITHOUT BEHAVIORAL DISTURBANCE (HCC): ICD-10-CM

## 2018-06-28 DIAGNOSIS — R73.09 ABNORMAL GLUCOSE: ICD-10-CM

## 2018-06-28 DIAGNOSIS — E78.5 HYPERLIPIDEMIA, UNSPECIFIED HYPERLIPIDEMIA TYPE: ICD-10-CM

## 2018-06-28 DIAGNOSIS — I10 ESSENTIAL HYPERTENSION: ICD-10-CM

## 2018-06-28 DIAGNOSIS — E55.9 VITAMIN D DEFICIENCY: ICD-10-CM

## 2018-06-28 DIAGNOSIS — F02.80 LATE ONSET ALZHEIMER'S DISEASE WITHOUT BEHAVIORAL DISTURBANCE (HCC): ICD-10-CM

## 2018-06-28 LAB
ALBUMIN SERPL-MCNC: 3.8 G/DL (ref 3.4–5)
ALBUMIN/GLOB SERPL: 1 {RATIO} (ref 0.8–1.7)
ALP SERPL-CCNC: 85 U/L (ref 45–117)
ALT SERPL-CCNC: 54 U/L (ref 16–61)
ANION GAP SERPL CALC-SCNC: 10 MMOL/L (ref 3–18)
AST SERPL-CCNC: 33 U/L (ref 15–37)
BASOPHILS # BLD: 0 K/UL (ref 0–0.06)
BASOPHILS NFR BLD: 1 % (ref 0–2)
BILIRUB SERPL-MCNC: 1.7 MG/DL (ref 0.2–1)
BUN SERPL-MCNC: 14 MG/DL (ref 7–18)
BUN/CREAT SERPL: 15 (ref 12–20)
CALCIUM SERPL-MCNC: 9.4 MG/DL (ref 8.5–10.1)
CHLORIDE SERPL-SCNC: 104 MMOL/L (ref 100–108)
CHOLEST SERPL-MCNC: 144 MG/DL
CO2 SERPL-SCNC: 27 MMOL/L (ref 21–32)
CREAT SERPL-MCNC: 0.96 MG/DL (ref 0.6–1.3)
DIFFERENTIAL METHOD BLD: ABNORMAL
EOSINOPHIL # BLD: 0.2 K/UL (ref 0–0.4)
EOSINOPHIL NFR BLD: 4 % (ref 0–5)
ERYTHROCYTE [DISTWIDTH] IN BLOOD BY AUTOMATED COUNT: 15.1 % (ref 11.6–14.5)
EST. AVERAGE GLUCOSE BLD GHB EST-MCNC: 105 MG/DL
GLOBULIN SER CALC-MCNC: 3.9 G/DL (ref 2–4)
GLUCOSE SERPL-MCNC: 83 MG/DL (ref 74–99)
HBA1C MFR BLD: 5.3 % (ref 4.2–5.6)
HCT VFR BLD AUTO: 49.6 % (ref 36–48)
HDLC SERPL-MCNC: 68 MG/DL (ref 40–60)
HDLC SERPL: 2.1 {RATIO} (ref 0–5)
HGB BLD-MCNC: 16.2 G/DL (ref 13–16)
LDLC SERPL CALC-MCNC: 59.2 MG/DL (ref 0–100)
LIPID PROFILE,FLP: ABNORMAL
LYMPHOCYTES # BLD: 1.6 K/UL (ref 0.9–3.6)
LYMPHOCYTES NFR BLD: 27 % (ref 21–52)
MCH RBC QN AUTO: 30.7 PG (ref 24–34)
MCHC RBC AUTO-ENTMCNC: 32.7 G/DL (ref 31–37)
MCV RBC AUTO: 93.9 FL (ref 74–97)
MONOCYTES # BLD: 0.4 K/UL (ref 0.05–1.2)
MONOCYTES NFR BLD: 7 % (ref 3–10)
NEUTS SEG # BLD: 3.6 K/UL (ref 1.8–8)
NEUTS SEG NFR BLD: 61 % (ref 40–73)
PLATELET # BLD AUTO: 178 K/UL (ref 135–420)
PMV BLD AUTO: 11 FL (ref 9.2–11.8)
POTASSIUM SERPL-SCNC: 3.7 MMOL/L (ref 3.5–5.5)
PROT SERPL-MCNC: 7.7 G/DL (ref 6.4–8.2)
RBC # BLD AUTO: 5.28 M/UL (ref 4.7–5.5)
SODIUM SERPL-SCNC: 141 MMOL/L (ref 136–145)
TRIGL SERPL-MCNC: 84 MG/DL (ref ?–150)
VLDLC SERPL CALC-MCNC: 16.8 MG/DL
WBC # BLD AUTO: 5.9 K/UL (ref 4.6–13.2)

## 2018-06-28 PROCEDURE — 80061 LIPID PANEL: CPT | Performed by: INTERNAL MEDICINE

## 2018-06-28 PROCEDURE — 83036 HEMOGLOBIN GLYCOSYLATED A1C: CPT | Performed by: INTERNAL MEDICINE

## 2018-06-28 PROCEDURE — 36415 COLL VENOUS BLD VENIPUNCTURE: CPT | Performed by: INTERNAL MEDICINE

## 2018-06-28 PROCEDURE — 85025 COMPLETE CBC W/AUTO DIFF WBC: CPT | Performed by: INTERNAL MEDICINE

## 2018-06-28 PROCEDURE — 80053 COMPREHEN METABOLIC PANEL: CPT | Performed by: INTERNAL MEDICINE

## 2018-06-28 PROCEDURE — 82306 VITAMIN D 25 HYDROXY: CPT | Performed by: INTERNAL MEDICINE

## 2018-06-29 LAB — 25(OH)D3 SERPL-MCNC: 41.9 NG/ML (ref 30–100)

## 2018-07-02 ENCOUNTER — OFFICE VISIT (OUTPATIENT)
Dept: INTERNAL MEDICINE CLINIC | Age: 83
End: 2018-07-02

## 2018-07-02 ENCOUNTER — HOSPITAL ENCOUNTER (OUTPATIENT)
Dept: GENERAL RADIOLOGY | Age: 83
Discharge: HOME | End: 2018-07-02
Payer: MEDICARE

## 2018-07-02 ENCOUNTER — HOSPITAL ENCOUNTER (OUTPATIENT)
Dept: LAB | Age: 83
Discharge: HOME | End: 2018-07-02
Payer: MEDICARE

## 2018-07-02 VITALS
SYSTOLIC BLOOD PRESSURE: 98 MMHG | WEIGHT: 139.7 LBS | OXYGEN SATURATION: 98 % | TEMPERATURE: 97.3 F | BODY MASS INDEX: 22.45 KG/M2 | RESPIRATION RATE: 15 BRPM | HEART RATE: 54 BPM | HEIGHT: 66 IN | DIASTOLIC BLOOD PRESSURE: 62 MMHG

## 2018-07-02 DIAGNOSIS — F02.80 ALZHEIMER'S DEMENTIA WITHOUT BEHAVIORAL DISTURBANCE, UNSPECIFIED TIMING OF DEMENTIA ONSET: ICD-10-CM

## 2018-07-02 DIAGNOSIS — R03.1 LOW BLOOD PRESSURE READING: ICD-10-CM

## 2018-07-02 DIAGNOSIS — G30.9 ALZHEIMER'S DEMENTIA WITHOUT BEHAVIORAL DISTURBANCE, UNSPECIFIED TIMING OF DEMENTIA ONSET: ICD-10-CM

## 2018-07-02 DIAGNOSIS — R05.9 COUGH: ICD-10-CM

## 2018-07-02 DIAGNOSIS — H61.21 EXCESSIVE CERUMEN IN RIGHT EAR CANAL: ICD-10-CM

## 2018-07-02 DIAGNOSIS — R05.9 COUGH: Primary | ICD-10-CM

## 2018-07-02 DIAGNOSIS — I48.91 ATRIAL FIBRILLATION, UNSPECIFIED TYPE (HCC): ICD-10-CM

## 2018-07-02 DIAGNOSIS — R00.1 BRADYCARDIA: ICD-10-CM

## 2018-07-02 LAB
ALBUMIN SERPL-MCNC: 3.3 G/DL (ref 3.4–5)
ALBUMIN/GLOB SERPL: 1 {RATIO} (ref 0.8–1.7)
ALP SERPL-CCNC: 70 U/L (ref 45–117)
ALT SERPL-CCNC: 45 U/L (ref 16–61)
ANION GAP SERPL CALC-SCNC: 7 MMOL/L (ref 3–18)
AST SERPL-CCNC: 29 U/L (ref 15–37)
BASOPHILS # BLD: 0 K/UL (ref 0–0.06)
BASOPHILS NFR BLD: 0 % (ref 0–2)
BILIRUB SERPL-MCNC: 1.3 MG/DL (ref 0.2–1)
BNP SERPL-MCNC: 2286 PG/ML (ref 0–1800)
BUN SERPL-MCNC: 20 MG/DL (ref 7–18)
BUN/CREAT SERPL: 19 (ref 12–20)
CALCIUM SERPL-MCNC: 8.7 MG/DL (ref 8.5–10.1)
CHLORIDE SERPL-SCNC: 104 MMOL/L (ref 100–108)
CO2 SERPL-SCNC: 29 MMOL/L (ref 21–32)
CREAT SERPL-MCNC: 1.04 MG/DL (ref 0.6–1.3)
DIFFERENTIAL METHOD BLD: ABNORMAL
EOSINOPHIL # BLD: 0.2 K/UL (ref 0–0.4)
EOSINOPHIL NFR BLD: 4 % (ref 0–5)
ERYTHROCYTE [DISTWIDTH] IN BLOOD BY AUTOMATED COUNT: 15 % (ref 11.6–14.5)
GLOBULIN SER CALC-MCNC: 3.3 G/DL (ref 2–4)
GLUCOSE SERPL-MCNC: 137 MG/DL (ref 74–99)
HCT VFR BLD AUTO: 44.3 % (ref 36–48)
HGB BLD-MCNC: 14.5 G/DL (ref 13–16)
LYMPHOCYTES # BLD: 1.1 K/UL (ref 0.9–3.6)
LYMPHOCYTES NFR BLD: 20 % (ref 21–52)
MCH RBC QN AUTO: 30.4 PG (ref 24–34)
MCHC RBC AUTO-ENTMCNC: 32.7 G/DL (ref 31–37)
MCV RBC AUTO: 92.9 FL (ref 74–97)
MONOCYTES # BLD: 0.3 K/UL (ref 0.05–1.2)
MONOCYTES NFR BLD: 6 % (ref 3–10)
NEUTS SEG # BLD: 3.7 K/UL (ref 1.8–8)
NEUTS SEG NFR BLD: 70 % (ref 40–73)
PLATELET # BLD AUTO: 172 K/UL (ref 135–420)
PMV BLD AUTO: 10.8 FL (ref 9.2–11.8)
POTASSIUM SERPL-SCNC: 3.6 MMOL/L (ref 3.5–5.5)
PROT SERPL-MCNC: 6.6 G/DL (ref 6.4–8.2)
RBC # BLD AUTO: 4.77 M/UL (ref 4.7–5.5)
SODIUM SERPL-SCNC: 140 MMOL/L (ref 136–145)
WBC # BLD AUTO: 5.3 K/UL (ref 4.6–13.2)

## 2018-07-02 PROCEDURE — 36415 COLL VENOUS BLD VENIPUNCTURE: CPT | Performed by: PHYSICIAN ASSISTANT

## 2018-07-02 PROCEDURE — 71046 X-RAY EXAM CHEST 2 VIEWS: CPT

## 2018-07-02 PROCEDURE — 85025 COMPLETE CBC W/AUTO DIFF WBC: CPT | Performed by: PHYSICIAN ASSISTANT

## 2018-07-02 PROCEDURE — 80053 COMPREHEN METABOLIC PANEL: CPT | Performed by: PHYSICIAN ASSISTANT

## 2018-07-02 PROCEDURE — 83880 ASSAY OF NATRIURETIC PEPTIDE: CPT | Performed by: PHYSICIAN ASSISTANT

## 2018-07-02 RX ORDER — DILTIAZEM HYDROCHLORIDE 120 MG/1
120 TABLET, FILM COATED ORAL
COMMUNITY
Start: 2018-06-16 | End: 2018-07-17 | Stop reason: ALTCHOICE

## 2018-07-02 NOTE — MR AVS SNAPSHOT
[facility] 
 
 
 [address], [address] [place] [address] [facility] 
[phone] Patient: Joel Noble MRN: [id number] GBV:[id number] Visit Information Date & Time Provider Department Dept. Phone Encounter #  
 7/2/2018  3:00 PM Erica Higgins [facility] [address] [place] [address] [id number] [phone] Your Appointments 7/17/2018  1:00 PM  
Office Visit with Arabella Rdz MD  
[facility] of [address] [place] [address] [address] [facility]) Appt Note: 6 month f/u; RESCHEDULED FROM 06/28/2018  
 [address] [facility] [address] [address] [place]  
  
   
 [address], [address] Upcoming Health Maintenance Date Due Influenza Age 5 to Adult 8/1/2018 GLAUCOMA SCREENING Q2Y 11/28/2018 MEDICARE YEARLY EXAM 12/27/2018 DTaP/Tdap/Td series (2 - Td) 11/20/2026 Allergies as of 7/2/2018  Review Complete On: 7/2/2018 By: GRAZYNA Higgins Severity Noted Reaction Type Reactions Latex Medium 12/26/2017    Other (comments) Daughter is not sure why patient is allergic Bacitracin High 12/26/2017    Rash  
 blisters Sulfa (Sulfonamide Antibiotics) High 07/07/2011    Unknown (comments) Antihistamines - Alkylamine  07/07/2011    Unknown (comments) Current Immunizations  Reviewed on 11/10/2015 Name Date Influenza High Dose Vaccine PF 9/23/2017, 11/10/2015  2:39 PM, 10/6/2014 Influenza Vaccine Whole 12/30/2009 Pneumococcal Conjugate (PCV-13) 11/10/2015  2:41 PM  
 TD Vaccine 12/16/2003 Tdap 11/10/2015  2:54 PM  
 ZZZ-RETIRED (DO NOT USE) Pneumococcal Vaccine (Unspecified Type) 12/23/2005 Not reviewed this visit You Were Diagnosed With   
  
 Codes Comments Cough    -  Primary ICD-10-CM: M55 ICD-9-CM: 786.2 Alzheimer's dementia without behavioral disturbance, unspecified timing of dementia onset     ICD-10-CM: G30.9, F02.80 ICD-9-CM: 331.0, 294.10 Atrial fibrillation, unspecified type ([facility]     ICD-10-CM: I48.91 
ICD-9-CM: 427.31 Low blood pressure reading     ICD-10-CM: R03.1 ICD-9-CM: 796.3 Vitals BP Pulse Temp Resp Height(growth percentile) Weight(growth percentile) 98/62 (BP 1 Location: Left arm, BP Patient Position: Sitting) (!) 54 97.3 °F (36.3 °C) (Axillary) 15 5' 6\" (1.676 m) 139 lb 11.2 oz (63.4 kg) SpO2 BMI Smoking Status 98% 22.55 kg/m2 Former Smoker Vitals History BMI and BSA Data Body Mass Index Body Surface Area  
 22.55 kg/m 2 1.72 m 2 Preferred Pharmacy Pharmacy Name Phone [facility], [address] [place] [address] [phone] Your Updated Medication List  
  
   
This list is accurate as of 7/2/18  3:46 PM.  Always use your most recent med list.  
  
  
  
  
 aspirin 81 mg tablet Take 81 mg by mouth daily. atorvastatin 40 mg tablet Commonly known as:  LIPITOR  
TAKE ONE TABLET BY MOUTH ONE TIME DAILY CENTRUM SILVER ULTRA MEN'S PO Take 1 Tab by mouth daily. clopidogrel 75 mg Tab Commonly known as:  PLAVIX TAKE 1 TABLET BY MOUTH EVERY DAY  
  
 CO Q-10 100 mg capsule Generic drug:  co-enzyme Q-10 Take 200 mg by mouth daily. DENTA 5000 PLUS 1.1 % Crea Generic drug:  fluoride (sodium) Take  by mouth two (2) times a day. Indications: DENTAL PLAQUE PREVENTION, GINGIVITIS  
  
 dilTIAZem 120 mg tablet Commonly known as:  CARDIZEM Take 120 mg by mouth. donepezil 10 mg tablet Commonly known as:  ARICEPT  
TAKE 1 TAB BY MOUTH NIGHTLY.  
  
 dronabinol 5 mg capsule Commonly known as:  Kimberley Dopp Take 5 mg by mouth two (2) times a day. EMERGEN-C PO Take 1 Tab by mouth daily. FISH OIL 1,000 mg (120 mg-180 mg) capsule Generic drug:  omega 3-DHA-EPA-fish oil Take 1 Cap by mouth daily. FISH OIL 1,000 mg Cap Generic drug:  omega-3 fatty acids-vitamin e Take 1 Cap by mouth daily. ipratropium 0.03 % nasal spray Commonly known as:  ATROVENT  
2 Sprays by Both Nostrils route two (2) times a day. 2-3x/day  
  
 memantine 5 mg tablet Commonly known as:  Merrilyn Ree TAKE 1 TAB BY MOUTH DAILY. nitroglycerin 0.4 mg SL tablet Commonly known as:  NITROSTAT  
1 tab subling every 5 min for chest pain  Maximum 3 doses for any 1 episode PROBIOTIC 4X 10-15 mg Tbec Generic drug:  B.infantis-B.ani-B.long-B.bifi Take 1 Tab by mouth daily. VITAMIN B-12 2,000 mcg Tber Generic drug:  cyanocobalamin Take 2,000 mcg by mouth daily. VITAMIN D3 1,000 unit Cap Generic drug:  cholecalciferol Take 1,000 Units by mouth daily. To-Do List   
 07/02/2018 Lab:  CBC WITH AUTOMATED DIFF   
  
 07/02/2018 Lab:  METABOLIC PANEL, COMPREHENSIVE   
  
 07/02/2018 Lab:  NT-PRO BNP Around 07/02/2018 Imaging:  XR CHEST PA LAT Introducing [facility] & HEALTH SERVICES! Consuelo Narvaez introduces [employer] patient portal. Now you can access parts of your medical record, email your doctor's office, and request medication refills online. 1. In your internet browser, go to [email or web address] 2. Click on the First Time User? Click Here link in the Sign In box. You will see the New Member Sign Up page. 3. Enter your [employer] Access Code exactly as it appears below. You will not need to use this code after youve completed the sign-up process. If you do not sign up before the expiration date, you must request a new code. · [employer] Access Code: [email or web address] Expires: 9/30/2018  2:58 PM 
 
4. Enter the last four digits of your Social Security Number (xxxx) and Date of Birth (mm/dd/yyyy) as indicated and click Submit. You will be taken to the next sign-up page. 5. Create a [employer] ID. This will be your [employer] login ID and cannot be changed, so think of one that is secure and easy to remember. 6. Create a [employer] password. You can change your password at any time. 7. Enter your Password Reset Question and Answer. This can be used at a later time if you forget your password. 8. Enter your e-mail address. You will receive e-mail notification when new information is available in [address] 9. Click Sign Up. You can now view and download portions of your medical record. 10. Click the Download Summary menu link to download a portable copy of your medical information. If you have questions, please visit the Frequently Asked Questions section of the [employer] website. Remember, [employer] is NOT to be used for urgent needs. For medical emergencies, dial 911. Now available from your iPhone and Android! Please provide this summary of care documentation to your next provider. Your primary care clinician is listed as Jose Cruz Lemon. If you have any questions after today's visit, please call [phone].

## 2018-07-02 NOTE — PROGRESS NOTES
HPI/History  Cornelia Barros is a 80 y.o.  male who presents for evaluation. Accompanied by daughter. Pt with dementia and guest provides all information. Daughter recently retired and now home more with pt and is primary caregiver. Has noticed a nonproductive cough x 2 days. Intermittent mild cough at night is known baseline but guest being present in day has allowed more observation and she is unsure if the daytime cough is also baseline. Additionally, she reports having an ongoing issue for her but was told she has had relapse in walking PNA about 4 days ago and wanted pt evaluated. Pt with clear rhinorrhea at baseline without change; using flonase. No other known sxs, including no fevers. Pt with Afib and was started on diltiazem latter part of May per Dr. Migdalia Manrique. BP on the low side here today and for last day or so at home. Usually around 515 systolic at home; more recently a little higher than our reading today but lower than the usual 358 systolic. Guest denies any changes in LE edema/swelling. No other overt sxs/complaints. Weight has increased since February (6lbs) but daughter reports she has been paying more attention to intake, including boost.    6/28 CBC unremarkable; CMP unremarkable aside from 1.7 total bilirubin. Patient Active Problem List   Diagnosis Code    Hyperlipidemia E78.5    ASHD (arteriosclerotic heart disease)LAD stent 2011 I25.10    Neurogenic bladder N31.9    Alzheimer's disease G30.9, F02.80    Dysphagia R13.10    Essential hypertension I10    Inguinal hernia unilateral, non-recurrent K40.90    PAD (peripheral artery disease) (HCC) I73.9    Allergic contact dermatitis L23.9    Atrial fibrillation (HCC) I48.91    Abnormal glucose R73.09     Past Medical History:   Diagnosis Date    Acute coronary syndrome ([facility] [address] 12/26/2011    PCI [facility] LAD at [facility] in [place].     Dementia due to Alzheimer's disease     Hepatitis B     about 60 years ago   [facility] Hyperlipidemia     Hypertension     Paroxysmal SVT (supraventricular tachycardia) (HCC)     Stroke [facility])     carotid artery right side     Past Surgical History:   Procedure Laterality Date    CARDIAC SURG PROCEDURE UNLIST      HX HEENT      tonsillectomy     Social History     Social History    Marital status:      Spouse name: N/A    Number of children: N/A    Years of education: N/A     Occupational History    Not on file. Social History Main Topics    Smoking status: Former Smoker    Smokeless tobacco: Never Used    Alcohol use No    Drug use: No    Sexual activity: No     Other Topics Concern    Not on file     Social History Narrative     Family History   Problem Relation Age of Onset    Diabetes Sister     Stroke Sister      Current Outpatient Prescriptions   Medication Sig    dilTIAZem (CARDIZEM) 120 mg tablet Take 120 mg by mouth.  clopidogrel (PLAVIX) 75 mg tab TAKE 1 TABLET BY MOUTH EVERY DAY    donepezil (ARICEPT) 10 mg tablet TAKE 1 TAB BY MOUTH NIGHTLY.  memantine (NAMENDA) 5 mg tablet TAKE 1 TAB BY MOUTH DAILY.  ipratropium (ATROVENT) 0.03 % nasal spray 2 Sprays by Both Nostrils route two (2) times a day. 2-3x/day    Omega-3-DHA-EPA-Fish Oil (FISH OIL) 1,000 mg (120 mg-180 mg) cap Take 1 Cap by mouth daily.  dronabinol (MARINOL) 5 mg capsule Take 5 mg by mouth two (2) times a day.  cyanocobalamin (VITAMIN B-12) 2,000 mcg TbER Take 2,000 mcg by mouth daily.  DENTA 5000 PLUS 1.1 % crea Take  by mouth two (2) times a day. Indications: DENTAL PLAQUE PREVENTION, GINGIVITIS    MULTIVIT-MIN/FA/LYCOPEN/LUTEIN (CENTRUM SILVER ULTRA MEN'S PO) Take 1 Tab by mouth daily.  B.infantis-B.ani-B.long-B.bifi (PROBIOTIC 4X) 10-15 mg TbEC Take 1 Tab by mouth daily.  co-enzyme Q-10 (CO Q-10) 100 mg capsule Take 200 mg by mouth daily.  ASCORBIC ACID/MULTIVIT-MIN (EMERGEN-C PO) Take 1 Tab by mouth daily.     atorvastatin (LIPITOR) 40 mg tablet TAKE ONE TABLET BY MOUTH ONE TIME DAILY    nitroglycerin (NITROSTAT) 0.4 mg SL tablet 1 tab subling every 5 min for chest pain  Maximum 3 doses for any 1 episode    Cholecalciferol, Vitamin D3, (VITAMIN D3) 1,000 unit cap Take 1,000 Units by mouth daily.  omega-3 fatty acids-vitamin e (FISH OIL) 1,000 mg Cap Take 1 Cap by mouth daily.  aspirin 81 mg tablet Take 81 mg by mouth daily. No current facility-administered medications for this visit. Allergies   Allergen Reactions    Latex Other (comments)     Daughter is not sure why patient is allergic    Bacitracin Rash     blisters    Sulfa (Sulfonamide Antibiotics) Unknown (comments)    Antihistamines - Alkylamine Unknown (comments)       Review of Systems  Aside from those included in HPI, remainder of ROS negative. Physical Examination  Visit Vitals    BP 98/62 (BP 1 Location: Left arm, BP Patient Position: Sitting)    Pulse (!) 54    Temp 97.3 °F (36.3 °C) (Axillary)    Resp 15    Ht 5' 6\" (1.676 m)    Wt 139 lb 11.2 oz (63.4 kg)    SpO2 98%    BMI 22.55 kg/m2       General - Alert and in no acute distress. Pt appears well and comfortable. Answers simple questions and is pleasant. Nontoxic. Not anxious, non-diaphoretic. Mental status - Appropriate speech content and dress. Eyes - Pupils equal and reactive, extraocular movements intact. No erythema or discharge. Ears - Right canal with excess cerumen obscuring view. Left canal and TM appear normal.  Nose - No erythema. No rhinorrhea. Neck - Supple without rigidity. Pulm - No cough during entire visit. No tachypnea, retractions, or cyanosis. Good/fair respiratory effort. Pt had difficulty coordinating with deep breath prompting, however, appears equal and clear to auscultation bilat. No appreciable wheezes, rales, or rhonchi. Cardiovascular - Mild bradycardia. Few irregularities but mostly regular rhythm currently. No appreciable murmurs or gallops today.  Stockings in place but no significant edema today.    Assessment and Plan  1. Cough - Pt with dementia; hx provided by guest. Cough may be his norm. No overt signs of infectious process. Pt with hx Afib and on diltiazem; BP and HR on low side. Some weight gain which may be improvement in nutritional intake. However, (given hx, findings, and limitations) will check CXR, CBC, CMP, and NTproBNP. 2. Low BP reading; mild bradycardia - Ensure hydration. Monitor very closely and contact cardio if BP or HR consistently on low side as discussed; changes in regimen at cardio discretion. Will check labs and imaging as above. 3. Right excess cerumen - Discussed OTC/home measures. Usually removed by Dr. Meredith Raygoza, who they will contact if needed. Return/call if needed or developments, otherwise, further planning as warranted. Pt and guest happily agree with plan. PLEASE NOTE:   This document has been produced using voice recognition software. Unrecognized errors in transcription may be present.     Jayant Herrera [employer] [facility] [place]  [phone]  7/2/2018

## 2018-07-03 ENCOUNTER — TELEPHONE (OUTPATIENT)
Dept: INTERNAL MEDICINE CLINIC | Age: 83
End: 2018-07-03

## 2018-07-03 NOTE — TELEPHONE ENCOUNTER
Spoke with Ms. Davey Signs #[phone]. States pt is doing well. No developments/worsening or other. BP and HR improved per today's check per report. Informed of results, including BNP. They will contact cardio following our conservation for further planning, etc.    Also discussed CXR results which reports no evidence of PNA or CHF but questionable density/opacity and recommended CT. Daughter wishes to hold for now and contact cardio then readdress CT in future (pending progression and discussion with Dr. Nando Salomon at next visit). They will keep appt with Dr. Nando Salomon on 7/17 and return/call sooner if needed. Daughter understands and happily agrees with plan.

## 2018-07-16 RX ORDER — ATORVASTATIN CALCIUM 40 MG/1
40 TABLET, FILM COATED ORAL DAILY
Qty: 90 TAB | Refills: 3 | Status: SHIPPED | OUTPATIENT
Start: 2018-07-16 | End: 2018-01-01 | Stop reason: SDUPTHER

## 2018-07-16 NOTE — TELEPHONE ENCOUNTER
Last Visit: 07/02/2018 with GRAZYNA Wolf    Next Appointment: 07/17/2018 with MD Ania Cox   Previous Refill Encounters: 07/20/2017 per MD Ania Cox #[phone] with 3 refills     Requested Prescriptions     Pending Prescriptions Disp Refills    atorvastatin (LIPITOR) 40 mg tablet 90 Tab 3     Sig: Take 1 Tab by mouth daily.

## 2018-07-17 ENCOUNTER — OFFICE VISIT (OUTPATIENT)
Dept: INTERNAL MEDICINE CLINIC | Age: 83
End: 2018-07-17

## 2018-07-17 VITALS
WEIGHT: 141.4 LBS | HEART RATE: 74 BPM | HEIGHT: 66 IN | BODY MASS INDEX: 22.73 KG/M2 | OXYGEN SATURATION: 98 % | DIASTOLIC BLOOD PRESSURE: 66 MMHG | TEMPERATURE: 97.9 F | SYSTOLIC BLOOD PRESSURE: 100 MMHG | RESPIRATION RATE: 14 BRPM

## 2018-07-17 DIAGNOSIS — I10 ESSENTIAL HYPERTENSION: ICD-10-CM

## 2018-07-17 DIAGNOSIS — Z86.73 H/O: CVA (CEREBROVASCULAR ACCIDENT): ICD-10-CM

## 2018-07-17 DIAGNOSIS — F02.818 LATE ONSET ALZHEIMER'S DISEASE WITH BEHAVIORAL DISTURBANCE (HCC): Primary | ICD-10-CM

## 2018-07-17 DIAGNOSIS — I25.10 ASHD (ARTERIOSCLEROTIC HEART DISEASE): ICD-10-CM

## 2018-07-17 DIAGNOSIS — I48.91 ATRIAL FIBRILLATION, UNSPECIFIED TYPE (HCC): ICD-10-CM

## 2018-07-17 DIAGNOSIS — R73.09 ABNORMAL GLUCOSE: ICD-10-CM

## 2018-07-17 DIAGNOSIS — E78.5 HYPERLIPIDEMIA, UNSPECIFIED HYPERLIPIDEMIA TYPE: ICD-10-CM

## 2018-07-17 DIAGNOSIS — G30.1 LATE ONSET ALZHEIMER'S DISEASE WITH BEHAVIORAL DISTURBANCE (HCC): Primary | ICD-10-CM

## 2018-07-17 DIAGNOSIS — I73.9 PAD (PERIPHERAL ARTERY DISEASE) (HCC): ICD-10-CM

## 2018-07-17 NOTE — MR AVS SNAPSHOT
[address] [facility] 
 
 
 [address], [address] [facility] [address] [facility] 
[phone] Patient: Ulises Diaz MRN: [id number] PPY:[id number] Visit Information Date & Time Provider Department Dept. Phone Encounter #  
 7/17/2018  1:00 PM Catherine Sung MD [facility] [address] [place] [address] [phone] Follow-up Instructions Return in about 6 months (around 1/17/2019), or if symptoms worsen or fail to improve. Your Appointments 1/15/2019 11:15 AM  
LAB with [facility] NURSE VISIT [facility] [address] [place] [address] ([address] [facility]) Appt Note: lab  
 [address] [place], [address] [address] [address] [place]  
  
   
 [address], [place]  
  
    
 1/22/2019  1:00 PM  
Office Visit with Catherine Sung MD  
[facility] [address]) Appt Note: 6 month f/u  
 [address] [facility], [address] [address] [address] [place]  
  
   
 [address], [facility] Upcoming Health Maintenance Date Due Influenza Age 5 to Adult 8/1/2018 GLAUCOMA SCREENING Q2Y 11/28/2018 MEDICARE YEARLY EXAM 12/27/2018 DTaP/Tdap/Td series (2 - Td) 11/20/2026 Allergies as of 7/17/2018  Review Complete On: 7/17/2018 By: Yulia Reese Severity Noted Reaction Type Reactions Latex Medium 12/26/2017    Other (comments) Daughter is not sure why patient is allergic Bacitracin High 12/26/2017    Rash  
 blisters Sulfa (Sulfonamide Antibiotics) High 07/07/2011    Unknown (comments) Antihistamines - Alkylamine  07/07/2011    Unknown (comments) Current Immunizations  Reviewed on 11/10/2015 Name Date Influenza High Dose Vaccine PF 9/23/2017, 11/10/2015  2:39 PM, 10/6/2014 Influenza Vaccine Whole 12/30/2009 Pneumococcal Conjugate (PCV-13) 11/10/2015  2:41 PM  
 TD Vaccine 12/16/2003  Tdap 11/10/2015  2:54 PM  
 ZZZ-RETIRED (DO NOT USE) Pneumococcal Vaccine (Unspecified Type) 12/23/2005 Not reviewed this visit Vitals BP Pulse Temp Resp Height(growth percentile) Weight(growth percentile) 100/66 (BP 1 Location: Right arm, BP Patient Position: Sitting) 74 97.9 °F (36.6 °C) (Oral) 14 5' 6\" (1.676 m) 141 lb 6.4 oz (64.1 kg) SpO2 BMI Smoking Status 98% 22.82 kg/m2 Former Smoker Vitals History BMI and BSA Data Body Mass Index Body Surface Area  
 22.82 kg/m 2 1.73 m 2 Preferred Pharmacy Pharmacy Name Phone [facility] [phone] Your Updated Medication List  
  
   
This list is accurate as of 7/17/18  1:45 PM.  Always use your most recent med list.  
  
  
  
  
 aspirin 81 mg tablet Take 81 mg by mouth daily. atorvastatin 40 mg tablet Commonly known as:  LIPITOR Take 1 Tab by mouth daily. CENTRUM SILVER ULTRA MEN'S PO Take 1 Tab by mouth daily. clopidogrel 75 mg Tab Commonly known as:  PLAVIX TAKE 1 TABLET BY MOUTH EVERY DAY  
  
 CO Q-10 100 mg capsule Generic drug:  co-enzyme Q-10 Take 200 mg by mouth daily. DENTA 5000 PLUS 1.1 % Crea Generic drug:  fluoride (sodium) Take  by mouth two (2) times a day. Indications: DENTAL PLAQUE PREVENTION, GINGIVITIS  
  
 donepezil 10 mg tablet Commonly known as:  ARICEPT  
TAKE 1 TAB BY MOUTH NIGHTLY.  
  
 dronabinol 5 mg capsule Commonly known as:  Jose Karla Take 5 mg by mouth two (2) times a day. EMERGEN-C PO Take 1 Tab by mouth daily. FIBER 6 PO Take 5 mg by mouth. FISH OIL 1,000 mg Cap Generic drug:  omega-3 fatty acids-vitamin e Take 1 Cap by mouth daily. ipratropium 0.03 % nasal spray Commonly known as:  ATROVENT  
2 Sprays by Both Nostrils route two (2) times a day. 2-3x/day  
  
 memantine 5 mg tablet Commonly known as:  Moe Bartlett TAKE 1 TAB BY MOUTH DAILY. nitroglycerin 0.4 mg SL tablet Commonly known as:  NITROSTAT  
1 tab subling every 5 min for chest pain  Maximum 3 doses for any 1 episode PROBIOTIC 4X 10-15 mg Tbec Generic drug:  B.infantis-B.ani-B.long-B.bifi Take 1 Tab by mouth daily. VITAMIN B-12 2,000 mcg Tber Generic drug:  cyanocobalamin Take 2,000 mcg by mouth daily. VITAMIN C PO Take  by mouth. VITAMIN D3 1,000 unit Cap Generic drug:  cholecalciferol Take 1,000 Units by mouth daily. Follow-up Instructions Return in about 6 months (around 1/17/2019), or if symptoms worsen or fail to improve. Patient Instructions Preventing Falls: Care Instructions Your Care Instructions Getting around your home safely can be a challenge if you have injuries or health problems that make it easy for you to fall. Loose rugs and furniture in walkways are among the dangers for many older people who have problems walking or who have poor eyesight. People who have conditions such as arthritis, osteoporosis, or dementia also have to be careful not to fall. You can make your home safer with a few simple measures. Follow-up care is a key part of your treatment and safety. Be sure to make and go to all appointments, and call your doctor if you are having problems. It's also a good idea to know your test results and keep a list of the medicines you take. How can you care for yourself at home? Taking care of yourself · You may get dizzy if you do not drink enough water. To prevent dehydration, drink plenty of fluids, enough so that your urine is light yellow or clear like water. Choose water and other caffeine-free clear liquids. If you have kidney, heart, or liver disease and have to limit fluids, talk with your doctor before you increase the amount of fluids you drink. · Exercise regularly to improve your strength, muscle tone, and balance. Walk if you can. Swimming may be a good choice if you cannot walk easily. · Have your vision and hearing checked each year or any time you notice a change. If you have trouble seeing and hearing, you might not be able to avoid objects and could lose your balance. · Know the side effects of the medicines you take. Ask your doctor or pharmacist whether the medicines you take can affect your balance. Sleeping pills or sedatives can affect your balance. · Limit the amount of alcohol you drink. Alcohol can impair your balance and other senses. · Ask your doctor whether calluses or corns on your feet need to be removed. If you wear loose-fitting shoes because of calluses or corns, you can lose your balance and fall. · Talk to your doctor if you have numbness in your feet. Preventing falls at home · Remove raised doorway thresholds, throw rugs, and clutter. Repair loose carpet or raised areas in the floor. · Move furniture and electrical cords to keep them out of walking paths. · Use nonskid floor wax, and wipe up spills right away, especially on ceramic tile floors. · If you use a walker or cane, put rubber tips on it. If you use crutches, clean the bottoms of them regularly with an abrasive pad, such as steel wool. · Keep your house well lit, especially Iona Allis, and outside walkways. Use night-lights in areas such as hallways and bathrooms. Add extra light switches or use remote switches (such as switches that go on or off when you clap your hands) to make it easier to turn lights on if you have to get up during the night. · Install sturdy handrails on stairways. · Move items in your cabinets so that the things you use a lot are on the lower shelves (about waist level). · Keep a cordless phone and a flashlight with new batteries by your bed. If possible, put a phone in each of the main rooms of your house, or carry a cell phone in case you fall and cannot reach a phone.  Or, you can wear a device around your neck or wrist. You push a button that sends a signal for help. · Wear low-heeled shoes that fit well and give your feet good support. Use footwear with nonskid soles. Check the heels and soles of your shoes for wear. Repair or replace worn heels or soles. · Do not wear socks without shoes on wood floors. · Walk on the grass when the sidewalks are slippery. If you live in an area that gets snow and ice in the winter, sprinkle salt on slippery steps and sidewalks. Preventing falls in the bath · Install grab bars and nonskid mats inside and outside your shower or tub and near the toilet and sinks. · Use shower chairs and bath benches. · Use a hand-held shower head that will allow you to sit while showering. · Get into a tub or shower by putting the weaker leg in first. Get out of a tub or shower with your strong side first. 
· Repair loose toilet seats and consider installing a raised toilet seat to make getting on and off the toilet easier. · Keep your bathroom door unlocked while you are in the shower. Where can you learn more? Go to http://esteban-linh.info/. Enter [phone] in the search box to learn more about \"Preventing Falls: Care Instructions. \" Current as of: May 12, 2017 Content Version: 11.7 © 7193-4618 [employer]. Care instructions adapted under license by [employer] (which disclaims liability or warranty for this information). If you have questions about a medical condition or this instruction, always ask your healthcare professional. [place] [address] any warranty or liability for your use of this information. Introducing [facility]! [employer] introduces [employer] patient portal. Now you can access parts of your medical record, email your doctor's office, and request medication refills online. 1. In your internet browser, go to [email or web address] People 2. Click on the First Time User? Click Here link in the Sign In box. You will see the New Member Sign Up page. 3. Enter your [employer] Access Code exactly as it appears below. You will not need to use this code after youve completed the sign-up process. If you do not sign up before the expiration date, you must request a new code. · [employer] Access Code: [email or web address] Expires: 9/30/2018  2:58 PM 
 
4. Enter the last four digits of your Social Security Number (xxxx) and Date of Birth (mm/dd/yyyy) as indicated and click Submit. You will be taken to the next sign-up page. 5. Create a [employer] ID. This will be your [employer] login ID and cannot be changed, so think of one that is secure and easy to remember. 6. Create a [employer] password. You can change your password at any time. 7. Enter your Password Reset Question and Answer. This can be used at a later time if you forget your password. 8. Enter your e-mail address. You will receive e-mail notification when new information is available in [address] 9. Click Sign Up. You can now view and download portions of your medical record. 10. Click the Download Summary menu link to download a portable copy of your medical information. If you have questions, please visit the Frequently Asked Questions section of the [employer] website. Remember, [employer] is NOT to be used for urgent needs. For medical emergencies, dial 911. Now available from your iPhone and Android! Please provide this summary of care documentation to your next provider. Your primary care clinician is listed as Mile Rodriguez. If you have any questions after today's visit, please call [phone].

## 2018-07-17 NOTE — PATIENT INSTRUCTIONS

## 2018-07-17 NOTE — PROGRESS NOTES
Chief Complaint   Patient presents with    Dementia     6 month follow up with lab review. 1. Have you been to the ER, urgent care clinic or hospitalized since your last visit? NO.     2. Have you seen or consulted any other health care providers outside of the [address] [place] [address] since your last visit (Include any pap smears or colon screening)?  NO

## 2018-07-18 ENCOUNTER — TELEPHONE (OUTPATIENT)
Dept: INTERNAL MEDICINE CLINIC | Age: 83
End: 2018-07-18

## 2018-07-18 NOTE — TELEPHONE ENCOUNTER
Ryanne Ashraf call re: a letter.  She wouldn't say what it was about only that you know- call # [phone] when its ready

## 2018-07-18 NOTE — LETTER
7/18/2018 5:54 PM 
 
[facility] [phone] To whom it may concern: Mr. Odilia Levy is under the care of [facility] [address] [place] [address]. He has advanced dementia secondary to Alzheimer's disease. Due to his advanced dementia, he is incapable of managing his own finances. Please let me know if you have any questions. Sincerely, Ramirez Nicholas MD

## 2018-07-21 PROBLEM — Z86.73 H/O: CVA (CEREBROVASCULAR ACCIDENT): Status: ACTIVE | Noted: 2018-07-21

## 2018-07-21 NOTE — PROGRESS NOTES
HPI:   Ta Sampson is a 80y.o. year old male who presents today for evaluation of hypertension, hyperlipidemia, dementia due to Alzheimer's disease, ASCVD s/p PCI of LAD 2011 and h/o CVA, and h/o SVT. He is accompanied by his daughter, who is his sole caregiver and provides all of the history. She reports that she has now retired and provides 24 hour care for her father. She states that his memory continues to slowly decline. He is ambulating with a cane and rollator, although will use a wheelchair for any distances. She states that he has not had any recent falls. His appetite and oral intake is good, and he is approximately the same weight he was at his last visit in 12/2017. He continues to take dronabinol. He is sleeping well, going to bed at 8 pm and awakening at 11 am. She states that he does appear to be having visual hallucinations, seeing and talking to people who are not present. He continues to require assistance with all of his ADL's. He was evaluated by GRAZYNA Vega on 7/2/2018 for a nonproductive cough, and a chest x-ray was obtained, showing a moderate to large hiatal hernia, a T11 compression deformity, and an oval focal density in the retrocardiac region adjacent to the cardiac apex, measuring about 2.0 x 2.3 cm. She states that his cough seems to have resolved. She states that he does not have other complaints. He has a history of dementia due to Alzheimer's disease that was first noticed in 1/2012 and progressed with significant worsening in 4/2014. 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 The patient was started on Aricept, with improvement noted in his mood and behavior.  In 9/2015, he was noted to have a jerking movement of his head to the right in the morning that lasted 30 seconds. There was no loss of consciousness, but the patient slept for several hours after the event. He was evaluated by Dr. Nico Kevin in 11/2015 for concerns that this may have been seizure activity. Evaluation included: EEG with mild generalized slowing consistent with dementia and no epileptiform activity. ESR/ T. Pallidum FTA-Ab/ folate/ SINDY/ ceruloplasmin/ copper/ TSH/ and B12 were all normal. Repeat MRI (11/2015) revealed a possiblly new subacute infarct in right parietal white matter; otherwise unchanged from 2014. Carotid duplex scan (12/2015) showed mild (< 50%) stenosis bilaterally of the internal carotid arteries. According to the daughter, she was told by Dr. Nico Kevin that the episode from 9/2015 was most likely due to a small stroke. He is currently being treated with Aricept and Namenda. He has a history of hypertension and hyperlipidemia, and in 1/2012, developed an acute MI while driving home to [place] from [place]. He presented with acute coronary syndrome to [facility] (records unavailable) and reportedly underwent PCI and stent placement in the LAD. Left ventricular ejection fraction was reportedly depressed at 30% immediately post MI. He was treated with aspirin and clopidogrel. He reportedly did not have follow-up with Cardiology until 5/2014 when he was referred to see Dr. Paul Mims. Repeat echocardiogram in 10/2015 revealed mild concentric LVH with normal LV function (EF 65%) with mild MR and TR. He has remained asymptomatic and his regimen includes aspirin (81 mg), clopidogrel and high intensity dose atorvastatin. He was previously on carvedilol, ramipril, and hydrochlorothiazide for hypertension, but these were discontinued given episodes of hypotension. In 4/2017, he was found to have new onset rate-controlled atrial fibrillation. The decision was made not to proceed with anti-coagulation given his advanced dementia and fall risk.  He was seen in follow-up in 5/2018 and was noted to be in atrial fibrillation with rapid ventricular response with rate of 122 bpm on EKG. He was asymptomatic, and was started on diltiazem  mg daily. However, he did not tolerate it due to hypotension, and it was discontinued. He is being followed by Dr. Travis Mccain. He also has a history of retinal detachment and is being followed by Dr. Mariajose Holder. Past Medical History:   Diagnosis Date    Acute coronary syndrome ([address] 12/26/2011    PCI of LAD at [facility] in [place].  Dementia due to Alzheimer's disease     Hepatitis B     about 60 years ago    Hyperlipidemia     Hypertension     Paroxysmal SVT (supraventricular tachycardia) (HCC)     Stroke [facility])     carotid artery right side     Past Surgical History:   Procedure Laterality Date    CARDIAC SURG PROCEDURE UNLIST      HX HEENT      tonsillectomy     Current Outpatient Prescriptions   Medication Sig    ascorbate calcium (VITAMIN C PO) Take  by mouth.  bran/gum/fib/femi/psyl/kelp/pec (FIBER 6 PO) Take 5 mg by mouth.  atorvastatin (LIPITOR) 40 mg tablet Take 1 Tab by mouth daily.  clopidogrel (PLAVIX) 75 mg tab TAKE 1 TABLET BY MOUTH EVERY DAY    donepezil (ARICEPT) 10 mg tablet TAKE 1 TAB BY MOUTH NIGHTLY.  memantine (NAMENDA) 5 mg tablet TAKE 1 TAB BY MOUTH DAILY.  ipratropium (ATROVENT) 0.03 % nasal spray 2 Sprays by Both Nostrils route two (2) times a day. 2-3x/day    dronabinol (MARINOL) 5 mg capsule Take 5 mg by mouth two (2) times a day.  cyanocobalamin (VITAMIN B-12) 2,000 mcg TbER Take 2,000 mcg by mouth daily.  DENTA 5000 PLUS 1.1 % crea Take  by mouth two (2) times a day. Indications: DENTAL PLAQUE PREVENTION, GINGIVITIS    MULTIVIT-MIN/FA/LYCOPEN/LUTEIN (CENTRUM SILVER ULTRA MEN'S PO) Take 1 Tab by mouth daily.  B.infantis-B.ani-B.long-B.bifi (PROBIOTIC 4X) 10-15 mg TbEC Take 1 Tab by mouth daily.  co-enzyme Q-10 (CO Q-10) 100 mg capsule Take 200 mg by mouth daily.     Cholecalciferol, Vitamin D3, (VITAMIN D3) 1,000 unit cap Take 1,000 Units by mouth daily.  omega-3 fatty acids-vitamin e (FISH OIL) 1,000 mg Cap Take 1 Cap by mouth daily.  aspirin 81 mg tablet Take 81 mg by mouth daily.  ASCORBIC ACID/MULTIVIT-MIN (EMERGEN-C PO) Take 1 Tab by mouth daily.  nitroglycerin (NITROSTAT) 0.4 mg SL tablet 1 tab subling every 5 min for chest pain  Maximum 3 doses for any 1 episode     No current facility-administered medications for this visit. Allergies and Intolerances: Allergies   Allergen Reactions    Latex Other (comments)     Daughter is not sure why patient is allergic    Bacitracin Rash     blisters    Sulfa (Sulfonamide Antibiotics) Unknown (comments)    Antihistamines - Alkylamine Unknown (comments)     Family History:   Family History   Problem Relation Age of Onset    Diabetes Sister     Stroke Sister      Social History:   He  reports that he has quit smoking. He has never used smokeless tobacco. He is . He lives with his daughter. He also has two sons. History   Alcohol Use No     Immunization History:  Immunization History   Administered Date(s) Administered    Influenza High Dose Vaccine PF 10/06/2014, 11/10/2015, 09/23/2017    Influenza Vaccine Whole 12/30/2009    Pneumococcal Conjugate (PCV-13) 11/10/2015    TD Vaccine 12/16/2003    Tdap 11/10/2015    ZZZ-RETIRED (DO NOT USE) Pneumococcal Vaccine (Unspecified Type) 12/23/2005       Review of Systems:   As above included in HPI. Otherwise 11 point review of systems negative including constitutional, skin, HENT, eyes, respiratory, cardiovascular, gastrointestinal, genitourinary, musculoskeletal, endo/heme/aller, neurological.    Physical:   Vitals:   BP: 100/66  HR: 74  WT: 141 lb 6.4 oz (64.1 kg)  BMI:  22.82 kg/m2    Exam:   Pt appears frail, calm, and responds to questions. HEENT: PERRLA, anicteric, cerumen in right ear canal, oropharynx clear, no JVD, adenopathy or thyromegaly.  No carotid bruits or radiated murmur. Lungs: clear to auscultation, no wheezes, rhonchi, or rales. Heart: irregular rate and rhythm. No murmur, rubs, gallops  Abdomen: soft, nontender, nondistended, normal bowel sounds, no hepatosplenomegaly or masses. Extremities: without edema. Pulses 1-2+ bilaterally. Review of Data:  Labs:  Hospital Outpatient Visit on 07/02/2018   Component Date Value Ref Range Status    WBC 07/02/2018 5.3  4.6 - 13.2 K/uL Final    RBC 07/02/2018 4.77  4.70 - 5.50 M/uL Final    HGB 07/02/2018 14.5  13.0 - 16.0 g/dL Final    HCT 07/02/2018 44.3  36.0 - 48.0 % Final    MCV 07/02/2018 92.9  74.0 - 97.0 FL Final    MCH 07/02/2018 30.4  24.0 - 34.0 PG Final    MCHC 07/02/2018 32.7  31.0 - 37.0 g/dL Final    RDW 07/02/2018 15.0* 11.6 - 14.5 % Final    PLATELET 48/58/5079 652  135 - 420 K/uL Final    MPV 07/02/2018 10.8  9.2 - 11.8 FL Final    NEUTROPHILS 07/02/2018 70  40 - 73 % Final    LYMPHOCYTES 07/02/2018 20* 21 - 52 % Final    MONOCYTES 07/02/2018 6  3 - 10 % Final    EOSINOPHILS 07/02/2018 4  0 - 5 % Final    BASOPHILS 07/02/2018 0  0 - 2 % Final    ABS. NEUTROPHILS 07/02/2018 3.7  1.8 - 8.0 K/UL Final    ABS. LYMPHOCYTES 07/02/2018 1.1  0.9 - 3.6 K/UL Final    ABS. MONOCYTES 07/02/2018 0.3  0.05 - 1.2 K/UL Final    ABS. EOSINOPHILS 07/02/2018 0.2  0.0 - 0.4 K/UL Final    ABS.  BASOPHILS 07/02/2018 0.0  0.0 - 0.06 K/UL Final    DF 07/02/2018 AUTOMATED    Final    Sodium 07/02/2018 140  136 - 145 mmol/L Final    Potassium 07/02/2018 3.6  3.5 - 5.5 mmol/L Final    Chloride 07/02/2018 104  100 - 108 mmol/L Final    CO2 07/02/2018 29  21 - 32 mmol/L Final    Anion gap 07/02/2018 7  3.0 - 18 mmol/L Final    Glucose 07/02/2018 137* 74 - 99 mg/dL Final    BUN 07/02/2018 20* 7.0 - 18 MG/DL Final    Creatinine 07/02/2018 1.04  0.6 - 1.3 MG/DL Final    BUN/Creatinine ratio 07/02/2018 19  12 - 20   Final    GFR est AA 07/02/2018 >60  >60 ml/min/1.73m2 Final    GFR est non-AA 07/02/2018 >60  >60 ml/min/1.73m2 Final    Calcium 07/02/2018 8.7  8.5 - 10.1 MG/DL Final    Bilirubin, total 07/02/2018 1.3* 0.2 - 1.0 MG/DL Final    ALT (SGPT) 07/02/2018 45  16 - 61 U/L Final    AST (SGOT) 07/02/2018 29  15 - 37 U/L Final    Alk. phosphatase 07/02/2018 70  45 - 117 U/L Final    Protein, total 07/02/2018 6.6  6.4 - 8.2 g/dL Final    Albumin 07/02/2018 3.3* 3.4 - 5.0 g/dL Final    Globulin 07/02/2018 3.3  2.0 - 4.0 g/dL Final    A-G Ratio 07/02/2018 1.0  0.8 - 1.7   Final    NT pro-BNP 07/02/2018 2286* 0 - 1800 PG/ML Final   Hospital Outpatient Visit on 06/28/2018   Component Date Value Ref Range Status    WBC 06/28/2018 5.9  4.6 - 13.2 K/uL Final    RBC 06/28/2018 5.28  4.70 - 5.50 M/uL Final    HGB 06/28/2018 16.2* 13.0 - 16.0 g/dL Final    HCT 06/28/2018 49.6* 36.0 - 48.0 % Final    MCV 06/28/2018 93.9  74.0 - 97.0 FL Final    MCH 06/28/2018 30.7  24.0 - 34.0 PG Final    MCHC 06/28/2018 32.7  31.0 - 37.0 g/dL Final    RDW 06/28/2018 15.1* 11.6 - 14.5 % Final    PLATELET 22/55/8648 581  135 - 420 K/uL Final    MPV 06/28/2018 11.0  9.2 - 11.8 FL Final    NEUTROPHILS 06/28/2018 61  40 - 73 % Final    LYMPHOCYTES 06/28/2018 27  21 - 52 % Final    MONOCYTES 06/28/2018 7  3 - 10 % Final    EOSINOPHILS 06/28/2018 4  0 - 5 % Final    BASOPHILS 06/28/2018 1  0 - 2 % Final    ABS. NEUTROPHILS 06/28/2018 3.6  1.8 - 8.0 K/UL Final    ABS. LYMPHOCYTES 06/28/2018 1.6  0.9 - 3.6 K/UL Final    ABS. MONOCYTES 06/28/2018 0.4  0.05 - 1.2 K/UL Final    ABS. EOSINOPHILS 06/28/2018 0.2  0.0 - 0.4 K/UL Final    ABS.  BASOPHILS 06/28/2018 0.0  0.0 - 0.06 K/UL Final    DF 06/28/2018 AUTOMATED    Final    Hemoglobin A1c 06/28/2018 5.3  4.2 - 5.6 % Final    Est. average glucose 06/28/2018 105  mg/dL Final    LIPID PROFILE 06/28/2018        Final    Cholesterol, total 06/28/2018 144  <200 MG/DL Final    Triglyceride 06/28/2018 84  <150 MG/DL Final    HDL Cholesterol 06/28/2018 68* 40 - 60 MG/DL Final    LDL, calculated 06/28/2018 59.2  0 - 100 MG/DL Final    VLDL, calculated 06/28/2018 16.8  MG/DL Final    CHOL/HDL Ratio 06/28/2018 2.1  0 - 5.0   Final    Sodium 06/28/2018 141  136 - 145 mmol/L Final    Potassium 06/28/2018 3.7  3.5 - 5.5 mmol/L Final    Chloride 06/28/2018 104  100 - 108 mmol/L Final    CO2 06/28/2018 27  21 - 32 mmol/L Final    Anion gap 06/28/2018 10  3.0 - 18 mmol/L Final    Glucose 06/28/2018 83  74 - 99 mg/dL Final    BUN 06/28/2018 14  7.0 - 18 MG/DL Final    Creatinine 06/28/2018 0.96  0.6 - 1.3 MG/DL Final    BUN/Creatinine ratio 06/28/2018 15  12 - 20   Final    GFR est AA 06/28/2018 >60  >60 ml/min/1.73m2 Final    GFR est non-AA 06/28/2018 >60  >60 ml/min/1.73m2 Final    Calcium 06/28/2018 9.4  8.5 - 10.1 MG/DL Final    Bilirubin, total 06/28/2018 1.7* 0.2 - 1.0 MG/DL Final    ALT (SGPT) 06/28/2018 54  16 - 61 U/L Final    AST (SGOT) 06/28/2018 33  15 - 37 U/L Final    Alk. phosphatase 06/28/2018 85  45 - 117 U/L Final    Protein, total 06/28/2018 7.7  6.4 - 8.2 g/dL Final    Albumin 06/28/2018 3.8  3.4 - 5.0 g/dL Final    Globulin 06/28/2018 3.9  2.0 - 4.0 g/dL Final    A-G Ratio 06/28/2018 1.0  0.8 - 1.7   Final    Vitamin D 25-Hydroxy 06/28/2018 41.9  30 - 100 ng/mL Final     Health Maintenance:  Screening:    Colorectal: colonoscopy (2003) normal. No further screening needed. Depression: none   DM (HbA1c/FPG): HbA1c 5.3 (6/2018)   Hepatitis C: N/A   Falls: No recent falls. Using rollator and wheelchair. Debilitation with unsteady gait. DEXA: N/A   PSA/PLACIDO: No further screening needed.    Glaucoma: regular eye exams with Dr. Haley Tirado (last 11/2016)   Smoking: none   Vitamin D: 41.9 (6/2018)   Medicare Wellness: 12/26/2017    Impression:  Patient Active Problem List   Diagnosis Code    Hyperlipidemia E78.5    ASHD (arteriosclerotic heart disease)LAD stent 2011 I25.10    Neurogenic bladder N31.9    Alzheimer's disease G30.9, F02.80    Dysphagia R13.10    Essential hypertension I10    Inguinal hernia unilateral, non-recurrent K40.90    PAD (peripheral artery disease) ([facility]) I73.9    Allergic contact dermatitis L23.9    Atrial fibrillation (HCC) I48.91    Abnormal glucose R73.09       Plan:  1. Hypertension. Blood pressure well controlled without medication. Previously was on carvedilol, ramipril, and hydrochlorothiazide. Renal function remains stable  with creatinine 1.21 / eGFR 57. Encouraged to continue drinking plenty of fluids. Continue to follow. 2. Alzheimer's disease. Slowly progressive. On Aricept and Namenda. Started on Marinol for decrease in appetite, and reports that appetite has now improved and weight appears to have stabilized. Now with 24 hour care. Fall precautions stressed. Follow. 3. CVA. Prior \"seizure-like\" episode in 2015 felt to be due to small stroke with new subacute infarct in right parietall white matter. Has multiple chronic infarcts and moderate chronic microvascular ischemic changes. On clopidogrel and aspirin. Follow. 4. ASHD. Currently asymptomatic and stable on current regimen. LV function recovered after initially stunning post-MI. On aspirin, clopidogrel, and statin. Being followed by Dr. Travis Mccain. 5. Atrial fibrillation. Opting not to proceed with long term anticoagulation ([id number] = 5) given fall risk and advanced dementia. On aspirin and Plavix. No longer on carvedilol given difficulty with hypotension. Did not tolerate trial of diltiazem for rate control due to hypotension. Being followed by Dr. Travis Mccain. 6. Hyperlipidemia. On high intensity dose atorvastatin with LDL 59, indicative of excellent control. Continue to follow. 7. Pulmonary nodule. Chest xray reporting questionable retrocardiac opacity. Review of prior x-rays showed similar report in 12/2017, although not mentioned in 2/2018.  Discussed at length with daughter, and decision made to not proceed with chest CT scan to evaluate given severe dementia and debilitation. Will continue to follow. 8. Abnormal glucose. HbA1c in normal range despite elevated fasting blood sugar. Continue to follow. 9. Aspiration risk. Daughter reported noting \"gurgling\" after eating. In home speech pathology evaluation performed in 1/2018 and reported safe tolerance for regular consistency diet and thin liquids without obvious clinical signs of aspiration. Continue to follow. 10. Fall risk. Patient with unsteady gait and requiring rollator for ambulation and wheelchair for distances. Debilitated status and concern for mobility and falls. 11. Health maintenance. Will not administer Shingrix vaccine given debilitated status and reactogenicity of the vaccine. Other immunizations up to date. No further colorectal or prostate screening. Vitamin D level normal. Fall precautions discussed. Continue regular eye exams with Dr. Betsy Gastelum. Total time: 40 minutes spent with the patient in face-to-face consultation of which greater than 50% was spent on counseling, answering questions and/or coordination of care. Complex medical review and management performed. Patient understands recommendations and agrees with plan. Follow-up in 6 months.

## 2018-08-23 RX ORDER — MEMANTINE HYDROCHLORIDE 5 MG/1
5 TABLET ORAL DAILY
Qty: 90 TAB | Refills: 1 | Status: SHIPPED | OUTPATIENT
Start: 2018-08-23 | End: 2019-01-01 | Stop reason: SDUPTHER

## 2018-08-23 NOTE — TELEPHONE ENCOUNTER
Insurance requires a minimum fill for 90 days. If appropriate, please sign pended medication order. If not, please notify me. Last Visit: 07/17/2018 with MD Gabe Delaney    Next Appointment: 01/22/2019 with MD Gabe Delaney     Requested Prescriptions     Pending Prescriptions Disp Refills    memantine (NAMENDA) 5 mg tablet 90 Tab 1     Sig: Take 1 Tab by mouth daily.

## 2018-09-18 RX ORDER — FLUTICASONE PROPIONATE 50 MCG
2 SPRAY, SUSPENSION (ML) NASAL DAILY
Qty: 3 BOTTLE | Refills: 3 | Status: SHIPPED | OUTPATIENT
Start: 2018-09-18 | End: 2019-01-01

## 2018-09-18 NOTE — TELEPHONE ENCOUNTER
Last Visit: 2018 with MD Ru Mcgee    Next Appointment: 2019 with MD Levy   Previous Refill Encounters: 2017 per MD Levy 3 bottles with 3 refills     Requested Prescriptions     Pending Prescriptions Disp Refills    fluticasone (FLONASE) 50 mcg/actuation nasal spray 3 Bottle 3     Si Sprays by Both Nostrils route daily.

## 2018-09-24 NOTE — TELEPHONE ENCOUNTER
Pt's daughter wants to spk to nurse about pt's recent condition and some of his behavioral issues, BS

## 2018-09-25 NOTE — TELEPHONE ENCOUNTER
Called both numbers on record for daughter and cell phone on record and both are inaccurate numbers.

## 2018-09-26 NOTE — TELEPHONE ENCOUNTER
Pt's daughter ret call to nurse, she gave another XA#[phone], she said she will be available all day today, BS

## 2018-09-26 NOTE — TELEPHONE ENCOUNTER
Called and spoke to patient's daughter and verified patient's full name and date of birth. She states he doesn't want to take his medication and she tries to put it in apple sauce and he spits it out and sometimes he sucks it or chews it up. She says that he will not take his Plavix at night with the other medications, and will take it in the morning with the chewable baby Asprin. She states that she has to hand feed him. He has been having issues with utensils and other dishes, where he doesn't know what they are used for. She states he has been gaining weight and it 136.4 pounds. She also states that he had some blood spots and constipation a few weeks ago and she has changed his diet after that happened and it has been ok since then. She is asking for a medical hospital bed due to him not being able to lie with a pillow next to him. She also is asking about his medication if they have them in liquid form or chewable.

## 2018-09-28 NOTE — TELEPHONE ENCOUNTER
Daughter given message- she will call [facility] to see if they can get the bed from them. She will call back with a fax number. Daughter is still puzzled on how to get meds in him- ands he is also wondering if you thought he might have a UTI due to the blood she had seen per previous message- he is trying to go to bed by 6pm-.  Please advise

## 2018-09-28 NOTE — TELEPHONE ENCOUNTER
Sathish Lala is no longer contracted with Medicare. Please fax order to [facility]. Also need copies of Medicare and [employer] card.     Phone: [phone]  Fax: [phone]  Attn: Nic Medina

## 2018-10-03 NOTE — TELEPHONE ENCOUNTER
[facility] calling, Says the office note they received with the order for bed does not state in the visit that patient needed a bed. Says she needs office note which states it was discussed with patient and why patient needs it. Do you need to see pt to get correct note?

## 2018-10-05 NOTE — TELEPHONE ENCOUNTER
Dr. Mae Peer, is this something I can add onto [facility] [employer] or [facility] [employer] [facility] since you are so booked?

## 2018-10-05 NOTE — TELEPHONE ENCOUNTER
Yes, would need evaluation in office to assess need as this was not addressed at his visit in July. Thanks.

## 2018-10-09 NOTE — PROGRESS NOTES
1. Have you been to the ER, urgent care clinic or hospitalized since your last visit? NO.     2. Have you seen or consulted any other health care providers outside of the [address] [place] [address] since your last visit (Include any pap smears or colon screening)?  NO

## 2018-10-09 NOTE — MR AVS SNAPSHOT
[address] [facility] 
 
 
 [address], [address] [place] [address] [facility] 
[phone] Patient: Helen Ortega MRN: [id number] TMZ:[id number] Visit Information Date & Time Provider Department Dept. Phone Encounter #  
 10/9/2018  2:30 PM Boo Quinn, [address] [address] [place] [address] [phone] [id number] Your Appointments 1/15/2019 11:15 AM  
LAB with [facility] NURSE VISIT [facility] [address] [place] [address] ([facility] Appt Note: lab  
 [address], [address]  
  
   
 [address], [address]  
  
    
 1/22/2019  1:00 PM  
Office Visit with Forest Fitzgerald MD  
[facility] Appt Note: 6 month f/u  
 [address] [facility], [address]  
  
   
 [address], [address] Upcoming Health Maintenance Date Due Shingrix Vaccine Age 50> (1 of 2) 5/21/1979 Influenza Age 5 to Adult 8/1/2018 GLAUCOMA SCREENING Q2Y 11/28/2018 MEDICARE YEARLY EXAM 12/27/2018 DTaP/Tdap/Td series (2 - Td) 11/20/2026 Allergies as of 10/9/2018  Review Complete On: 10/9/2018 By: Maryann Rosenberg LPN Severity Noted Reaction Type Reactions Latex Medium 12/26/2017    Other (comments) Daughter is not sure why patient is allergic Bacitracin High 12/26/2017    Rash  
 blisters Sulfa (Sulfonamide Antibiotics) High 07/07/2011    Unknown (comments) Antihistamines - Alkylamine  07/07/2011    Unknown (comments) Current Immunizations  Reviewed on 11/10/2015 Name Date Influenza High Dose Vaccine PF 9/23/2017, 11/10/2015  2:39 PM, 10/6/2014 Influenza Vaccine Whole 12/30/2009 Pneumococcal Conjugate (PCV-13) 11/10/2015  2:41 PM  
 TD Vaccine 12/16/2003 Tdap 11/10/2015  2:54 PM  
 ZZZ-RETIRED (DO NOT USE) Pneumococcal Vaccine (Unspecified Type) 12/23/2005 Not reviewed this visit Vitals BP Pulse Temp Resp Height(growth percentile) Weight(growth percentile) 104/60 (BP 1 Location: Left arm, BP Patient Position: Sitting) 64 97.5 °F (36.4 °C) (Oral) 14 5' 6\" (1.676 m) 140 lb (63.5 kg) SpO2 BMI Smoking Status 97% 22.6 kg/m2 Former Smoker Vitals History BMI and BSA Data Body Mass Index Body Surface Area  
 22.6 kg/m 2 1.72 m 2 Preferred Pharmacy Pharmacy Name Phone [facility] [place], [place]  [phone] Your Updated Medication List  
  
   
This list is accurate as of 10/9/18  3:09 PM.  Always use your most recent med list.  
  
  
  
  
 aspirin 81 mg tablet Take 81 mg by mouth daily. atorvastatin 40 mg tablet Commonly known as:  LIPITOR Take 1 Tab by mouth daily. CENTRUM SILVER ULTRA MEN'S PO Take 1 Tab by mouth daily. clopidogrel 75 mg Tab Commonly known as:  PLAVIX TAKE 1 TABLET BY MOUTH EVERY DAY  
  
 CO Q-10 100 mg capsule Generic drug:  co-enzyme Q-10 Take 200 mg by mouth daily. DENTA 5000 PLUS 1.1 % Crea Generic drug:  fluoride (sodium) Take  by mouth two (2) times a day. Indications: DENTAL PLAQUE PREVENTION, GINGIVITIS  
  
 donepezil 10 mg tablet Commonly known as:  ARICEPT  
TAKE 1 TAB BY MOUTH NIGHTLY.  
  
 dronabinol 5 mg capsule Commonly known as:  Arcenio Hint Take 5 mg by mouth two (2) times a day. EMERGEN-C PO Take 1 Tab by mouth daily. FIBER 6 PO Take 5 mg by mouth. FISH OIL 1,000 mg Cap Generic drug:  omega-3 fatty acids-vitamin e Take 1 Cap by mouth daily. fluticasone 50 mcg/actuation nasal spray Commonly known as:  Nazario Bores 2 Sprays by Both Nostrils route daily. ipratropium 0.03 % nasal spray Commonly known as:  ATROVENT  
2 Sprays by Both Nostrils route two (2) times a day. 2-3x/day  
  
 memantine 5 mg tablet Commonly known as:  Angela Medrano  
 Take 1 Tab by mouth daily. nitroglycerin 0.4 mg SL tablet Commonly known as:  NITROSTAT  
1 tab subling every 5 min for chest pain  Maximum 3 doses for any 1 episode PROBIOTIC 4X 10-15 mg Tbec Generic drug:  B.infantis-B.ani-B.long-B.bifi Take 1 Tab by mouth daily. VITAMIN B-12 2,000 mcg Tber Generic drug:  cyanocobalamin Take 3,000 mcg by mouth daily. VITAMIN C PO Take  by mouth. VITAMIN D3 1,000 unit Cap Generic drug:  cholecalciferol Take 1,000 Units by mouth daily. Introducing [facility]! [facility] introduces [email or web address] patient portal. Now you can access parts of your medical record, email your doctor's office, and request medication refills online. 1. In your internet browser, go to [email or web address] 2. Click on the First Time User? Click Here link in the Sign In box. You will see the New Member Sign Up page. 3. Enter your [email or web address] Access Code exactly as it appears below. You will not need to use this code after youve completed the sign-up process. If you do not sign up before the expiration date, you must request a new code. · [email or web address] Access Code: [email or web address] Expires: 1/7/2019  3:09 PM 
 
4. Enter the last four digits of your Social Security Number (xxxx) and Date of Birth (mm/dd/yyyy) as indicated and click Submit. You will be taken to the next sign-up page. 5. Create a [employer]u Network[employer] ID. This will be your [email or web address] login ID and cannot be changed, so think of one that is secure and easy to remember. 6. Create a [email or web address] password. You can change your password at any time. 7. Enter your Password Reset Question and Answer. This can be used at a later time if you forget your password. 8. Enter your e-mail address. You will receive e-mail notification when new information is available in [address] 9. Click Sign Up. You can now view and download portions of your medical record. 10. Click the Download Summary menu link to download a portable copy of your medical information. If you have questions, please visit the Frequently Asked Questions section of the [employer] website. Remember, [employer] is NOT to be used for urgent needs. For medical emergencies, dial 911. Now available from your iPhone and Android! Please provide this summary of care documentation to your next provider. Your primary care clinician is listed as Edith Wiley. If you have any questions after today's visit, please call [phone].

## 2018-10-10 NOTE — TELEPHONE ENCOUNTER
Called and spoke with patient daughter and gave message below. She verbalized understanding with no additional question.

## 2018-10-10 NOTE — TELEPHONE ENCOUNTER
Please inform daughter I discussed with Dr. Law Godinez and should be fine to increase appetite stimulant (dronabinol/marinol) to 10mg bid. Note: Will need to fax office note regarding hospital bed. Dr. Law Godinez previously provided script (see recent tele encounters).

## 2018-10-10 NOTE — TELEPHONE ENCOUNTER
Jeff Carton calling again. Says the note cannot say the family member is requesting the bed.  It has to state the bed is needed for medical reason such as the aspiration on the top of the med list.

## 2018-10-10 NOTE — TELEPHONE ENCOUNTER
Please fax note from visit with [facility] yesterday. That note addresses need for hospital bed. Thanks.

## 2018-10-10 NOTE — PROGRESS NOTES
HPI/History  Karen Brooks is a 80 y.o.  male who presents for evaluation. Accompanied by daughter. Pt with hx of Alzheimer's dz and other conditions as noted. Pt appears to be progressively deteriorating and requires a hospital bed for numerous reasons. Pt needs to sleep reclined given his hx of dysphagia and issues relating to his significant hiatal hernia, which is not able to be done effectively with his current bed/environment. Pt's decline has also put him at significant risk of falls from bed or otherwise. Pt is having increasing difficulty getting in and out of bed in a safe manner, mentally and physically. Dr. Law Godinez has provided script for hospital bed but note needed to clarify/justify the need. Discussed other issues/questions including pt's nutrition and intake. Patient Active Problem List   Diagnosis Code    Hyperlipidemia E78.5    ASHD (arteriosclerotic heart disease)LAD stent 2011 I25.10    Neurogenic bladder N31.9    Alzheimer's disease G30.9, F02.80    Dysphagia R13.10    Essential hypertension I10    Inguinal hernia unilateral, non-recurrent K40.90    PAD (peripheral artery disease) (HCC) I73.9    Allergic contact dermatitis L23.9    Atrial fibrillation (HCC) I48.91    Abnormal glucose R73.09    H/O: CVA (cerebrovascular accident) Z80.78     Past Medical History:   Diagnosis Date    Acute coronary syndrome ([facility] [address] 12/26/2011    PCI [facility] LAD at [facility] in [place].    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 Social History Main Topics    Smoking status: Former Smoker    Smokeless tobacco: Never Used    Alcohol use No    Drug use: No    Sexual activity: No     Other Topics Concern    Not on file     Social History Narrative     Family History   Problem Relation Age of Onset    Diabetes Sister     Stroke Sister      Current Outpatient Prescriptions   Medication Sig    memantine (NAMENDA) 5 mg tablet Take 1 Tab by mouth daily.  ascorbate calcium (VITAMIN C PO) Take  by mouth.  bran/gum/fib/femi/psyl/kelp/pec (FIBER 6 PO) Take 5 mg by mouth.  atorvastatin (LIPITOR) 40 mg tablet Take 1 Tab by mouth daily.  clopidogrel (PLAVIX) 75 mg tab TAKE 1 TABLET BY MOUTH EVERY DAY    donepezil (ARICEPT) 10 mg tablet TAKE 1 TAB BY MOUTH NIGHTLY.  ipratropium (ATROVENT) 0.03 % nasal spray 2 Sprays by Both Nostrils route two (2) times a day. 2-3x/day    dronabinol (MARINOL) 5 mg capsule Take 5 mg by mouth two (2) times a day.  cyanocobalamin (VITAMIN B-12) 2,000 mcg TbER Take 3,000 mcg by mouth daily.  DENTA 5000 PLUS 1.1 % crea Take  by mouth two (2) times a day. Indications: DENTAL PLAQUE PREVENTION, GINGIVITIS    MULTIVIT-MIN/FA/LYCOPEN/LUTEIN (CENTRUM SILVER ULTRA MEN'S PO) Take 1 Tab by mouth daily.  B.infantis-B.ani-B.long-B.bifi (PROBIOTIC 4X) 10-15 mg TbEC Take 1 Tab by mouth daily.  co-enzyme Q-10 (CO Q-10) 100 mg capsule Take 200 mg by mouth daily.  ASCORBIC ACID/MULTIVIT-MIN (EMERGEN-C PO) Take 1 Tab by mouth daily.  nitroglycerin (NITROSTAT) 0.4 mg SL tablet 1 tab subling every 5 min for chest pain  Maximum 3 doses for any 1 episode    Cholecalciferol, Vitamin D3, (VITAMIN D3) 1,000 unit cap Take 1,000 Units by mouth daily.  omega-3 fatty acids-vitamin e (FISH OIL) 1,000 mg Cap Take 1 Cap by mouth daily.  aspirin 81 mg tablet Take 81 mg by mouth daily.  fluticasone (FLONASE) 50 mcg/actuation nasal spray 2 Sprays by Both Nostrils route daily. No current facility-administered medications for this visit. Allergies   Allergen Reactions    Latex Other (comments)     Daughter is not sure why patient is allergic    Bacitracin Rash     blisters    Sulfa (Sulfonamide Antibiotics) Unknown (comments)    Antihistamines - Alkylamine Unknown (comments)       Review of Systems  Aside from those included in HPI, remainder of ROS negative. Physical Examination  Visit Vitals    /60 (BP 1 Location: Left arm, BP Patient Position: Sitting)    Pulse 64    Temp 97.5 °F (36.4 °C) (Oral)    Resp 14    Ht 5' 6\" (1.676 m)    Wt 140 lb (63.5 kg)    SpO2 97%    BMI 22.6 kg/m2       General - Alert and in no acute distress. Pt appears well and comfortable. Nontoxic. Not anxious, non-diaphoretic. Mental status -  Pt is pleasant. Engages when spoken to or questioned but in manner and affect c/w advanced Alzheimer's. Appropriately dressed. Pulm - No tachypnea, retractions, or cyanosis. Good respiratory effort. Cardiovascular - Normal rate    Assessment and Plan  1. Pt with hx of Alzheimer's and other conditions as noted. Pt appears to be progressively deteriorating and requires a hospital bed for numerous reasons. Pt needs to sleep reclined given his hx of dysphagia and issues relating to his significant hiatal hernia, which is not able to be done effectively with his current bed/environment. Pt's decline has also put him at significant risk of falls from bed or otherwise. Pt is having increasing difficulty getting in and out of bed in a safe manner mentally and physically. 2. Discussed nutrition, intake, and other questions/concerns of daughter. Further planning as warranted. Pt and daughter happily agree with plan. More than 40 mins spent during visit with more than 50% discussing above issues, plan, and questions. PLEASE NOTE:   This document has been produced using voice recognition software.  Unrecognized errors in transcription may be present.     Lori Lopez [employer] [facility]  [phone]  10/9/2018

## 2018-10-11 NOTE — TELEPHONE ENCOUNTER
Addended note for 2nd time as per recommendation from med supply. Of note, they should be looking at the assessment and plan and not relying on the order of need in the HPI, which has been their reported issue. However, I have changed this as well.

## 2018-10-11 NOTE — TELEPHONE ENCOUNTER
Absolutely ridiculous. It should not matter how the need was brought to attention, only that the need is there and has been outlined in the note. Nonetheless, I will addend my office note.

## 2018-10-26 NOTE — TELEPHONE ENCOUNTER
Called in new prescription dose Dronabinaol 5 mg capsules, 2 caps twice daily, to [facility] pharmacy.

## 2018-12-13 NOTE — ED NOTES
I have reviewed discharge instructions with the caregiver. The caregiver verbalized understanding. Discharge medications reviewed with caregiver and appropriate educational materials and side effects teaching were provided.

## 2018-12-13 NOTE — DISCHARGE INSTRUCTIONS
Urinary Tract Infections in Men: Care Instructions  Your Care Instructions    A urinary tract infection, or UTI, is a general term for an infection anywhere between the kidneys and the tip of the penis. UTIs can also be a result of a prostate problem. Most cause pain or burning when you urinate. Most UTIs are caused by bacteria and can be cured with antibiotics. It is important to complete your treatment so that the infection does not get worse. Follow-up care is a key part of your treatment and safety. Be sure to make and go to all appointments, and call your doctor if you are having problems. It's also a good idea to know your test results and keep a list of the medicines you take. How can you care for yourself at home? · Take your antibiotics as prescribed. Do not stop taking them just because you feel better. You need to take the full course of antibiotics. · Take your medicines exactly as prescribed. Your doctor may have prescribed a medicine, such as phenazopyridine (Pyridium), to help relieve pain when you urinate. This turns your urine orange. You may stop taking it when your symptoms get better. But be sure to take all of your antibiotics, which treat the infection. · Drink extra water for the next day or two. This will help make the urine less concentrated and help wash out the bacteria causing the infection. (If you have kidney, heart, or liver disease and have to limit your fluids, talk with your doctor before you increase your fluid intake.)  · Avoid drinks that are carbonated or have caffeine. They can irritate the bladder. · Urinate often. Try to empty your bladder each time. · To relieve pain, take a hot bath or lay a heating pad (set on low) over your lower belly or genital area. Never go to sleep with a heating pad in place. To help prevent UTIs  · Drink plenty of fluids, enough so that your urine is light yellow or clear like water.  If you have kidney, heart, or liver disease and have to limit fluids, talk with your doctor before you increase the amount of fluids you drink. · Urinate when you have the urge. Do not hold your urine for a long time. Urinate before you go to sleep. · Keep your penis clean. Catheter care  If you have a drainage tube (catheter) in place, the following steps will help you care for it. · Always wash your hands before and after touching your catheter. · Check the area around the urethra for inflammation or signs of infection. Signs of infection include irritated, swollen, red, or tender skin, or pus around the catheter. · Clean the area around the catheter with soap and water two times a day. Dry with a clean towel afterward. · Do not apply powder or lotion to the skin around the catheter. To empty the urine collection bag  · Wash your hands with soap and water. · Without touching the drain spout, remove the spout from its sleeve at the bottom of the collection bag. Open the valve on the spout. · Let the urine flow out of the bag and into the toilet or a container. Do not let the tubing or drain spout touch anything. · After you empty the bag, clean the end of the drain spout with tissue and water. Close the valve and put the drain spout back into its sleeve at the bottom of the collection bag. · Wash your hands with soap and water. When should you call for help? Call your doctor now or seek immediate medical care if:    · Symptoms such as a fever, chills, nausea, or vomiting get worse or happen for the first time.     · You have new pain in your back just below your rib cage. This is called flank pain.     · There is new blood or pus in your urine.     · You are not able to take or keep down your antibiotics.    Watch closely for changes in your health, and be sure to contact your doctor if:    · You are not getting better after taking an antibiotic for 2 days.     · Your symptoms go away but then come back. Where can you learn more?   Go to http://esteban-linh.info/. Enter J271 in the search box to learn more about \"Urinary Tract Infections in Men: Care Instructions. \"  Current as of: March 21, 2018  Content Version: 11.8  © 9005-0554 [employer] Care instructions adapted under license by [employer] (which disclaims liability or warranty for this information). If you have questions about a medical condition or this instruction, always ask your healthcare professional. [address] any warranty or liability for your use of this information. Altered Mental Status: Care Instructions  Your Care Instructions    Altered mental status is a change in how well your brain is working. As a result, you may be confused, be less alert than usual, or act in odd ways. This may include seeing or hearing things that aren't really there (hallucinations). A mental status change has many possible causes. For example, it may be the result of an infection, an imbalance of chemicals in the body, or a chronic disease such as diabetes or COPD. It can also be caused by things such as a head injury, taking certain medicines, or using alcohol or drugs. The doctor may do tests to look for the cause. These tests may include urine tests, blood tests, and imaging tests such as a CT scan. Sometimes a clear cause isn't found. But tests can help the doctor rule out a serious cause of your symptoms. A change in mental status can be scary. But mental status will often return to normal when the cause is treated. So it is important to get any follow-up testing or treatment the doctor has suggested. The doctor has checked you carefully, but problems can develop later. If you notice any problems or new symptoms, get medical treatment right away. Follow-up care is a key part of your treatment and safety. Be sure to make and go to all appointments, and call your doctor if you are having problems.  It's also a good idea to know your test results and keep a list of the medicines you take. How can you care for yourself at home? · Be safe with medicines. Take your medicines exactly as prescribed. Call your doctor if you think you are having a problem with your medicine. · Have another adult stay with you until you are better. This can help keep you safe. Ask that person to watch for signs that your mental status is getting worse. When should you call for help? Call 911 anytime you think you may need emergency care. For example, call if:    · You passed out (lost consciousness).    Call your doctor now or seek immediate medical care if:    · Your mental status is getting worse.     · You have new symptoms, such as a fever, chills, or shortness of breath.     · You do not feel safe.    Watch closely for changes in your health, and be sure to contact your doctor if:    · You do not get better as expected. Where can you learn more? Go to http://esteban-linh.info/. Enter Y818 in the search box to learn more about \"Altered Mental Status: Care Instructions. \"  Current as of: June 4, 2018  Content Version: 11.8  © 1572-2644 [employer]. Care instructions adapted under license by [employer] (which disclaims liability or warranty for this information). If you have questions about a medical condition or this instruction, always ask your healthcare professional. [address] any warranty or liability for your use of this information.

## 2018-12-13 NOTE — PROGRESS NOTES
1. Have you been to the ER, urgent care clinic or hospitalized since your last visit? NO.     2. Have you seen or consulted any other health care providers outside of the [address] [place] [address] since your last visit (Include any pap smears or colon screening)?  NO

## 2018-12-13 NOTE — TELEPHONE ENCOUNTER
BM had me call and triage patient. Patients daughter stated that she suspects her father has a UTI. She stated that she has noticed some dark discoloration in his depends. She also stated that he is reaching for things that are not there. She is not sure if this is UTI related or the dementia. She said that they had to put there dog down a few weeks ago and every since then her father has been off. She stated that he has not been running a fever but that he has been getting over a cold with a cough and runny nose.

## 2018-12-13 NOTE — ED PROVIDER NOTES
EMERGENCY DEPARTMENT HISTORY AND PHYSICAL EXAM    3:01 PM      Date: 2018  Patient Name: Helen Ortega    History of Presenting Illness     Chief Complaint   Patient presents with    Altered mental status    Cough         History Provided By: Patient's Daughter    Chief Complaint: AMS  Duration:  Days  Timing:  Acute  Location: neuro  Quality: n/a  Severity: N/A  Modifying Factors: none  Associated Symptoms: n/a      Additional History (Context): Helen Ortega is a 80 y.o. male with h/o Alzheimer's, HLD, AFib, stroke, and Hep B who presents with daughter due to AMS. Per the daughter there dog  about 10 days ago and since the pt has been declining. He has been eating ok but not drinking fluids as she should. Also he has been having more hallucinations and has been restless throughout the night. Also noticed his urine is a brownish color which concerned her for possible infection. Does have a chronic cough due to his CHF. Th pt is not on any diuretics. Usually ambulatory w/ a walker. Former smoker. The pt HPI and ROS limited due to his h/o Alzheimer's     PCP: Garth Slade MD    Current Facility-Administered Medications   Medication Dose Route Frequency Provider Last Rate Last Dose    sodium chloride (NS) flush 5-10 mL  5-10 mL IntraVENous PRN Rita Cross MD        cefepime (MAXIPIME) 2 g in sterile water (preservative free) 10 mL IV syringe  2 g IntraVENous Q12H Rita Cross MD   2 g at 18 1527     Current Outpatient Medications   Medication Sig Dispense Refill    cefdinir (OMNICEF) 300 mg capsule Take 1 Cap by mouth two (2) times a day. 20 Cap 0    atorvastatin (LIPITOR) 40 mg tablet Take 1 Tab by mouth daily. 90 Tab 3    dronabinol (MARINOL) 5 mg capsule Take 2 Caps by mouth two (2) times a day. Max Daily Amount: 20 mg. 120 Cap 2    fluticasone (FLONASE) 50 mcg/actuation nasal spray 2 Sprays by Both Nostrils route daily.  3 Bottle 3    memantine (NAMENDA) 5 mg tablet Take 1 Tab by mouth daily. 90 Tab 1    ascorbate calcium (VITAMIN C PO) Take  by mouth.  bran/gum/fib/femi/psyl/kelp/pec (FIBER 6 PO) Take 5 mg by mouth.  clopidogrel (PLAVIX) 75 mg tab TAKE 1 TABLET BY MOUTH EVERY DAY 90 Tab 3    donepezil (ARICEPT) 10 mg tablet TAKE 1 TAB BY MOUTH NIGHTLY. 90 Tab 3    ipratropium (ATROVENT) 0.03 % nasal spray 2 Sprays by Both Nostrils route two (2) times a day. 2-3x/day      cyanocobalamin (VITAMIN B-12) 2,000 mcg TbER Take 3,000 mcg by mouth daily.  DENTA 5000 PLUS 1.1 % crea Take  by mouth two (2) times a day. Indications: DENTAL PLAQUE PREVENTION, GINGIVITIS      MULTIVIT-MIN/FA/LYCOPEN/LUTEIN (CENTRUM SILVER ULTRA MEN'S PO) Take 1 Tab by mouth daily.  B.infantis-B.ani-B.long-B.bifi (PROBIOTIC 4X) 10-15 mg TbEC Take 1 Tab by mouth daily.  co-enzyme Q-10 (CO Q-10) 100 mg capsule Take 200 mg by mouth daily.  ASCORBIC ACID/MULTIVIT-MIN (EMERGEN-C PO) Take 1 Tab by mouth daily.  nitroglycerin (NITROSTAT) 0.4 mg SL tablet 1 tab subling every 5 min for chest pain  Maximum 3 doses for any 1 episode 25 Tab 5    Cholecalciferol, Vitamin D3, (VITAMIN D3) 1,000 unit cap Take 1,000 Units by mouth daily.  omega-3 fatty acids-vitamin e (FISH OIL) 1,000 mg Cap Take 1 Cap by mouth daily.  aspirin 81 mg tablet Take 81 mg by mouth daily. Past History     Past Medical History:  Past Medical History:   Diagnosis Date    Acute coronary syndrome ([address] 12/26/2011    PCI of LAD at [facility] in [place].     Dementia due to Alzheimer's disease     Hepatitis B     about 60 years ago    Hyperlipidemia     Hypertension     Paroxysmal SVT (supraventricular tachycardia) (HCC)     Stroke [facility])     carotid artery right side       Past Surgical History:  Past Surgical History:   Procedure Laterality Date    CARDIAC SURG PROCEDURE UNLIST      HX HEENT      tonsillectomy       Family History:  Family History   Problem Relation Age of Onset    Diabetes Sister     Stroke Sister        Social History:  Social History     Tobacco Use    Smoking status: Former Smoker    Smokeless tobacco: Never Used   Substance Use Topics    Alcohol use: No    Drug use: No       Allergies: Allergies   Allergen Reactions    Latex Other (comments)     Daughter is not sure why patient is allergic    Bacitracin Rash     blisters    Sulfa (Sulfonamide Antibiotics) Unknown (comments)    Antihistamines - Alkylamine Unknown (comments)         Review of Systems       Review of Systems   Unable to perform ROS: Dementia         Physical Exam     Visit Vitals  /89   Pulse 82   Temp 97.3 °F (36.3 °C)   Resp 16   SpO2 100%         Physical Exam   Constitutional: He is oriented to person, place, and time. He appears well-developed and well-nourished. No distress. Thin, cooperative    HENT:   Head: Normocephalic and atraumatic. Right Ear: External ear normal.   Left Ear: External ear normal.   Nose: Nose normal.   Mouth/Throat: Oropharynx is clear and moist.   Eyes: Conjunctivae and EOM are normal. Pupils are equal, round, and reactive to light. No scleral icterus. Neck: Normal range of motion. Neck supple. No JVD present. No tracheal deviation present. No thyromegaly present. Cardiovascular: Normal rate, regular rhythm, normal heart sounds and intact distal pulses. Exam reveals no gallop and no friction rub. No murmur heard. Pulmonary/Chest: Effort normal. He exhibits no tenderness. Coarse B   Abdominal: Soft. Bowel sounds are normal. He exhibits no distension. There is no tenderness. There is no rebound and no guarding. Musculoskeletal: Normal range of motion. He exhibits no edema or tenderness. Lymphadenopathy:     He has no cervical adenopathy. Neurological: He is alert and oriented to person, place, and time. No cranial nerve deficit. Coordination normal.   No sensory loss, Gait normal, Motor 5/5   Skin: Skin is warm and dry.    Psychiatric: Supportive daughter at the bedside, pt is DNR, DNI    Nursing note and vitals reviewed. Diagnostic Study Results     Labs -  Recent Results (from the past 12 hour(s))   EKG, 12 LEAD, INITIAL    Collection Time: 12/13/18  2:56 PM   Result Value Ref Range    Ventricular Rate 68 BPM    Atrial Rate 326 BPM    QRS Duration 78 ms    Q-T Interval 394 ms    QTC Calculation (Bezet) 418 ms    Calculated R Axis 7 degrees    Calculated T Axis -13 degrees    Diagnosis       Atrial fibrillation  Septal infarct , age undetermined  Abnormal ECG  No previous ECGs available     CBC WITH AUTOMATED DIFF    Collection Time: 12/13/18  3:10 PM   Result Value Ref Range    WBC 6.7 4.6 - 13.2 K/uL    RBC 5.18 4.70 - 5.50 M/uL    HGB 15.9 13.0 - 16.0 g/dL    HCT 48.2 (H) 36.0 - 48.0 %    MCV 93.1 74.0 - 97.0 FL    MCH 30.7 24.0 - 34.0 PG    MCHC 33.0 31.0 - 37.0 g/dL    RDW 14.2 11.6 - 14.5 %    PLATELET 183 902 - 449 K/uL    MPV 10.7 9.2 - 11.8 FL    NEUTROPHILS 77 (H) 40 - 73 %    LYMPHOCYTES 13 (L) 21 - 52 %    MONOCYTES 8 3 - 10 %    EOSINOPHILS 2 0 - 5 %    BASOPHILS 0 0 - 2 %    ABS. NEUTROPHILS 5.1 1.8 - 8.0 K/UL    ABS. LYMPHOCYTES 0.9 0.9 - 3.6 K/UL    ABS. MONOCYTES 0.5 0.05 - 1.2 K/UL    ABS. EOSINOPHILS 0.2 0.0 - 0.4 K/UL    ABS. BASOPHILS 0.0 0.0 - 0.1 K/UL    DF AUTOMATED     METABOLIC PANEL, COMPREHENSIVE    Collection Time: 12/13/18  3:10 PM   Result Value Ref Range    Sodium 140 136 - 145 mmol/L    Potassium 3.9 3.5 - 5.5 mmol/L    Chloride 106 100 - 108 mmol/L    CO2 31 21 - 32 mmol/L    Anion gap 3 3.0 - 18 mmol/L    Glucose 85 74 - 99 mg/dL    BUN 14 7.0 - 18 MG/DL    Creatinine 0.97 0.6 - 1.3 MG/DL    BUN/Creatinine ratio 14 12 - 20      GFR est AA >60 >60 ml/min/1.73m2    GFR est non-AA >60 >60 ml/min/1.73m2    Calcium 9.3 8.5 - 10.1 MG/DL    Bilirubin, total 2.1 (H) 0.2 - 1.0 MG/DL    ALT (SGPT) 60 16 - 61 U/L    AST (SGOT) 34 15 - 37 U/L    Alk.  phosphatase 105 45 - 117 U/L    Protein, total 8.1 6.4 - 8.2 g/dL Albumin 3.8 3.4 - 5.0 g/dL    Globulin 4.3 (H) 2.0 - 4.0 g/dL    A-G Ratio 0.9 0.8 - 1.7     CARDIAC PANEL,(CK, CKMB & TROPONIN)    Collection Time: 12/13/18  3:10 PM   Result Value Ref Range    CK 55 39 - 308 U/L    CK - MB 1.3 <3.6 ng/ml    CK-MB Index 2.4 0.0 - 4.0 %    Troponin-I, Qt. <0.02 0.0 - 0.045 NG/ML   PROTHROMBIN TIME + INR    Collection Time: 12/13/18  3:10 PM   Result Value Ref Range    Prothrombin time 13.3 11.5 - 15.2 sec    INR 1.0 0.8 - 1.2     PTT    Collection Time: 12/13/18  3:10 PM   Result Value Ref Range    aPTT 32.0 23.0 - 36.4 SEC   MAGNESIUM    Collection Time: 12/13/18  3:10 PM   Result Value Ref Range    Magnesium 2.2 1.6 - 2.6 mg/dL   NT-PRO BNP    Collection Time: 12/13/18  3:10 PM   Result Value Ref Range    NT pro-BNP 1,992 (H) 0 - 1,800 PG/ML   POC LACTIC ACID    Collection Time: 12/13/18  3:17 PM   Result Value Ref Range    Lactic Acid (POC) 1.66 0.40 - 2.00 mmol/L   URINALYSIS W/ RFLX MICROSCOPIC    Collection Time: 12/13/18  5:35 PM   Result Value Ref Range    Color YELLOW      Appearance CLEAR      Specific gravity 1.020 1.005 - 1.030      pH (UA) 6.0 5.0 - 8.0      Protein NEGATIVE  NEG mg/dL    Glucose NEGATIVE  NEG mg/dL    Ketone TRACE (A) NEG mg/dL    Bilirubin NEGATIVE  NEG      Blood NEGATIVE  NEG      Urobilinogen 1.0 0.2 - 1.0 EU/dL    Nitrites NEGATIVE  NEG      Leukocyte Esterase TRACE (A) NEG     URINE MICROSCOPIC ONLY    Collection Time: 12/13/18  5:35 PM   Result Value Ref Range    WBC 0 to 3 0 - 4 /hpf    RBC 0 to 3 0 - 5 /hpf    Epithelial cells NEGATIVE  0 - 5 /lpf    Bacteria NEGATIVE  NEG /hpf    Mucus 1+ (A) NEG /lpf       Radiologic Studies -   XR CHEST PA LAT   Final Result   IMPRESSION:   1. No acute infiltrate or effusion. 2.     3.   Moderate to large hiatal hernia. Medical Decision Making   I am the first provider for this patient.     I reviewed the vital signs, available nursing notes, past medical history, past surgical history, family history and social history. Vital Signs-Reviewed the patient's vital signs. Pulse Oximetry Analysis - 97% RA (WNL)    EKG: Interpreted by the EP. Time Interpreted: 1456   Rate: 68   Rhythm: Atrial Fibrillation    Interpretation:No STEMI      Records Reviewed: Nursing Notes and Old Medical Records (Time of Review: 3:01 PM)    ED Course: Progress Notes, Reevaluation, and Consults:  XR preliminary reading done by Dr. Mavis Homans, MD; pt xray shows Left lobe infiltrate vs pleural effusion    6:11 PM The pt is a little more alert. There is no evidence of sepsis. UA positive for infection and CXR suspicious for early pneumonia. Will put the pt on omnicef and recommends PCP f/u. The pt daughter is insightful and will return to the ED if any new or worsening sx. Provider Notes (Medical Decision Making):   MDM  Number of Diagnoses or Management Options  Diagnosis management comments: Pt is an 79yo male with a hx of CAD, CHF, progressive dementia, Hep B, HTN presents to the ED with complaint of fatigue, hallucinations, and decreased PO intake. Pt has elevated BP but concern for UTI per hx. Will initiate treatment for UTI, hydrate, lactate, cx, CXR, then reevaluate. Rommel Ahumada, DO 3:48 PM        Diagnosis     Clinical Impression:   1. Acute cystitis with hematuria    2. Altered mental status, unspecified altered mental status type        Disposition: home     Follow-up Information    None             Medication List      START taking these medications    cefdinir 300 mg capsule  Commonly known as:  OMNICEF  Take 1 Cap by mouth two (2) times a day. CONTINUE taking these medications    aspirin 81 mg tablet     atorvastatin 40 mg tablet  Commonly known as:  LIPITOR  Take 1 Tab by mouth daily.      CENTRUM SILVER ULTRA MEN'S PO     clopidogrel 75 mg Tab  Commonly known as:  PLAVIX  TAKE 1 TABLET BY MOUTH EVERY DAY     CO Q-10 100 mg capsule  Generic drug:  co-enzyme Q-10     DENTA 5000 PLUS 1.1 % Crea  Generic drug:  fluoride (sodium)     donepezil 10 mg tablet  Commonly known as:  ARICEPT  TAKE 1 TAB BY MOUTH NIGHTLY.     dronabinol 5 mg capsule  Commonly known as:  MARINOL  Take 2 Caps by mouth two (2) times a day. Max Daily Amount: 20 mg. EMERGEN-C PO     FIBER 6 PO     FISH OIL 1,000 mg Cap  Generic drug:  omega-3 fatty acids-vitamin e     fluticasone 50 mcg/actuation nasal spray  Commonly known as:  FLONASE  2 Sprays by Both Nostrils route daily. ipratropium 0.03 % nasal spray  Commonly known as:  ATROVENT     memantine 5 mg tablet  Commonly known as:  NAMENDA  Take 1 Tab by mouth daily. nitroglycerin 0.4 mg SL tablet  Commonly known as:  NITROSTAT  1 tab subling every 5 min for chest pain  Maximum 3 doses for any 1 episode     PROBIOTIC 4X 10-15 mg Tbec  Generic drug:  B.infantis-B.ani-B.long-B.bifi     VITAMIN B-12 2,000 mcg Tber  Generic drug:  cyanocobalamin     VITAMIN C PO     VITAMIN D3 1,000 unit Cap  Generic drug:  cholecalciferol           Where to Get Your Medications      These medications were sent to [address] [facility]  [address] [facility], [address]    Phone:  [phone]   · cefdinir 300 mg capsule       _______________________________       Scribe Carlyn Yeung acting as a scribe for and in the presence of Iliana Rodriguez MD      December 13, 2018 at 3:01 PM       Provider Attestation:      I personally performed the services described in the documentation, reviewed the documentation, as recorded by the scribe in my presence, and it accurately and completely records my words and actions.  December 13, 2018 at 3:01 PM - Iliana Rodriguez MD    _______________________________

## 2018-12-13 NOTE — ED TRIAGE NOTES
Pt.'s daughter states Juan Pablo Cunha has cut back on his drinking and eating; the last couple of days I've seen brownish discoloration in his depend. He also has a cough and hoarsness. Every since the dog  he's been having more hallucinations\".

## 2018-12-14 NOTE — TELEPHONE ENCOUNTER
He has a previously scheduled appointment in 4 weeks in 1/2019. Would recommend keeping that appointment unless he fails to improve.

## 2018-12-14 NOTE — TELEPHONE ENCOUNTER
Patient's daughter, Daphney Carter is calling. Patient was sent to the ER yesterday when he saw Dotty Elmore. They prescribed him Cefdinir twice a day. They gave him 10 days worth. They used a bag and a half of plasma. He was very dehydrated. He is doing better today. Hallucinations are down. Please advise if you would like to see him for a follow up and when.

## 2018-12-14 NOTE — TELEPHONE ENCOUNTER
Attempted to call daughter. I have tried number with and without [phone] and it keeps telling me the number cannot be completed as dialed.

## 2018-12-14 NOTE — PROGRESS NOTES
HPI/History  Lulu Menjivar is a 80 y.o.  male who presents for evaluation. Accompanied by daughter. Pt with hx noted below including Alzheimers dementia, past CVA, neurogenic bladder, and CV dz. Daughter reports somewhat acute deterioration in mental faculties and even more recently with hallucinations. They cannot get pt to drink much fluids and reports less than 3 glasses in the last 1-2 days. She has noted some malodorous and dark urine in his depends. No known fevers. No other sxs have been apparent. Daughter is very concerned, particularly regarding the probable dehydration and potential UTI. Patient Active Problem List   Diagnosis Code    Hyperlipidemia E78.5    ASHD (arteriosclerotic heart disease)LAD stent 2011 I25.10    Neurogenic bladder N31.9    Alzheimer's disease G30.9, F02.80    Dysphagia R13.10    Essential hypertension I10    Inguinal hernia unilateral, non-recurrent K40.90    PAD (peripheral artery disease) ([facility]) I73.9    Allergic contact dermatitis L23.9    Atrial fibrillation ([facility]) I48.91    Abnormal glucose R73.09    H/O: CVA (cerebrovascular accident) Z80.78     Past Medical History:   Diagnosis Date    Acute coronary syndrome ([facility] [address] 12/26/2011    PCI of LAD at [facility] in [place].     Dementia due to Alzheimer's disease     Hepatitis B     about 60 years ago    Hyperlipidemia     Hypertension     Paroxysmal SVT (supraventricular tachycardia) ([facility])     Stroke [facility])     carotid artery right side     Past Surgical History:   Procedure Laterality Date    CARDIAC SURG PROCEDURE UNLIST      HX HEENT      tonsillectomy     Social History     Socioeconomic History    Marital status:      Spouse name: Not on file    Number of children: Not on file    Years of education: Not on file    Highest education level: Not on file   Social Needs    Financial resource strain: Not on file    Food insecurity - worry: Not on file    Food insecurity - inability: Not on file    Transportation needs - medical: Not on file   [employer] needs - non-medical: Not on file   Occupational History    Not on file   Tobacco Use    Smoking status: Former Smoker    Smokeless tobacco: Never Used   Substance and Sexual Activity    Alcohol use: No    Drug use: No    Sexual activity: No   Other Topics Concern    Not on file   Social History Narrative    Not on file     Family History   Problem Relation Age of Onset    Diabetes Sister     Stroke Sister      Current Outpatient Medications   Medication Sig    atorvastatin (LIPITOR) 40 mg tablet Take 1 Tab by mouth daily.  dronabinol (MARINOL) 5 mg capsule Take 2 Caps by mouth two (2) times a day. Max Daily Amount: 20 mg.    memantine (NAMENDA) 5 mg tablet Take 1 Tab by mouth daily.  ascorbate calcium (VITAMIN C PO) Take  by mouth.  bran/gum/fib/femi/psyl/kelp/pec (FIBER 6 PO) Take 5 mg by mouth.  clopidogrel (PLAVIX) 75 mg tab TAKE 1 TABLET BY MOUTH EVERY DAY    donepezil (ARICEPT) 10 mg tablet TAKE 1 TAB BY MOUTH NIGHTLY.  ipratropium (ATROVENT) 0.03 % nasal spray 2 Sprays by Both Nostrils route two (2) times a day. 2-3x/day    cyanocobalamin (VITAMIN B-12) 2,000 mcg TbER Take 3,000 mcg by mouth daily.  B.infantis-B.ani-B.long-B.bifi (PROBIOTIC 4X) 10-15 mg TbEC Take 1 Tab by mouth daily.  co-enzyme Q-10 (CO Q-10) 100 mg capsule Take 200 mg by mouth daily.  ASCORBIC ACID/MULTIVIT-MIN (EMERGEN-C PO) Take 1 Tab by mouth daily.  nitroglycerin (NITROSTAT) 0.4 mg SL tablet 1 tab subling every 5 min for chest pain  Maximum 3 doses for any 1 episode    Cholecalciferol, Vitamin D3, (VITAMIN D3) 1,000 unit cap Take 1,000 Units by mouth daily.  omega-3 fatty acids-vitamin e (FISH OIL) 1,000 mg Cap Take 1 Cap by mouth daily.  aspirin 81 mg tablet Take 81 mg by mouth daily.  cefdinir (OMNICEF) 300 mg capsule Take 1 Cap by mouth two (2) times a day.     fluticasone (FLONASE) 50 mcg/actuation nasal spray 2 Sprays by Both Nostrils route daily.  DENTA 5000 PLUS 1.1 % crea Take  by mouth two (2) times a day. Indications: DENTAL PLAQUE PREVENTION, GINGIVITIS    MULTIVIT-MIN/FA/LYCOPEN/LUTEIN (CENTRUM SILVER ULTRA MEN'S PO) Take 1 Tab by mouth daily. No current facility-administered medications for this visit. Allergies   Allergen Reactions    Latex Other (comments)     Daughter is not sure why patient is allergic    Bacitracin Rash     blisters    Sulfa (Sulfonamide Antibiotics) Unknown (comments)    Antihistamines - Alkylamine Unknown (comments)       Review of Systems  Aside from those included in HPI, remainder of complete ROS negative. Physical Examination  Visit Vitals  /66 (BP 1 Location: Left arm, BP Patient Position: Sitting)   Pulse (!) 48   Temp 97.5 °F (36.4 °C) (Oral)   Resp 14   Ht 5' 6\" (1.676 m)   Wt 135 lb (61.2 kg)   SpO2 98%   BMI 21.79 kg/m²     Unable to obtain/provide urine sample and daughter states that he will likely need to be catheterized to do so. General - Alert and in no acute distress. Pt appears comfortable and remains pleasant. Not anxious, non-diaphoretic. In a wheelchair currently. Mental status - Effects of Alzheimer's dementia noted. Appropriate dress. Pulm - No tachypnea, retractions, or cyanosis. Good respiratory effort. Cardiovascular - Bradycardic. Remainder of exam deferred to impending ED eval.    Assessment and Plan  1. Pt with hx noted above including Alzheimers dementia, past CVA, neurogenic bladder, and CV dz. Reportedly with somewhat acute deterioration in mental faculties and even more recently with hallucinations. Pt also with probable dehydration and potential UTI (unable to obtain/provide sample). Discussed differentials, concerns, and considerations and opted for ED eval given his hx, sxs, limitations, and to expedite the process.  Daughter understands and happily agrees with plan and wishes to transport herself. Informed ED provider of impending arrival. Further planning as warranted. Pt/daughter happily agree with plan. PLEASE NOTE:   This document has been produced using voice recognition software. Unrecognized errors in transcription may be present.     Katy Jensen [employer] [address]  [phone]  12/13/2018

## 2019-01-01 ENCOUNTER — HOME CARE VISIT (OUTPATIENT)
Dept: SCHEDULING | Facility: HOME HEALTH | Age: 84
End: 2019-01-01
Payer: MEDICARE

## 2019-01-01 ENCOUNTER — HOME CARE VISIT (OUTPATIENT)
Dept: HOSPICE | Facility: HOSPICE | Age: 84
End: 2019-01-01
Payer: MEDICARE

## 2019-01-01 ENCOUNTER — HOSPITAL ENCOUNTER (OUTPATIENT)
Dept: LAB | Age: 84
Discharge: HOME | End: 2019-01-15
Payer: MEDICARE

## 2019-01-01 ENCOUNTER — TELEPHONE (OUTPATIENT)
Dept: INTERNAL MEDICINE CLINIC | Age: 84
End: 2019-01-01

## 2019-01-01 ENCOUNTER — HOSPICE ADMISSION (OUTPATIENT)
Dept: HOSPICE | Facility: HOSPICE | Age: 84
End: 2019-01-01
Payer: MEDICARE

## 2019-01-01 ENCOUNTER — HOSPITAL ENCOUNTER (OUTPATIENT)
Dept: LAB | Age: 84
Discharge: HOME | End: 2019-07-30
Payer: COMMERCIAL

## 2019-01-01 ENCOUNTER — OFFICE VISIT (OUTPATIENT)
Dept: INTERNAL MEDICINE CLINIC | Age: 84
End: 2019-01-01

## 2019-01-01 ENCOUNTER — APPOINTMENT (OUTPATIENT)
Dept: INTERNAL MEDICINE CLINIC | Age: 84
End: 2019-01-01

## 2019-01-01 ENCOUNTER — HOSPITAL ENCOUNTER (OUTPATIENT)
Dept: LAB | Age: 84
Discharge: HOME | End: 2019-04-02
Payer: MEDICARE

## 2019-01-01 VITALS
OXYGEN SATURATION: 100 % | SYSTOLIC BLOOD PRESSURE: 132 MMHG | HEART RATE: 104 BPM | BODY MASS INDEX: 21.79 KG/M2 | HEIGHT: 66 IN | DIASTOLIC BLOOD PRESSURE: 82 MMHG

## 2019-01-01 VITALS
RESPIRATION RATE: 14 BRPM | TEMPERATURE: 97.5 F | HEART RATE: 64 BPM | HEIGHT: 66 IN | BODY MASS INDEX: 23.3 KG/M2 | DIASTOLIC BLOOD PRESSURE: 74 MMHG | SYSTOLIC BLOOD PRESSURE: 102 MMHG | WEIGHT: 145 LBS | OXYGEN SATURATION: 91 %

## 2019-01-01 VITALS
HEART RATE: 78 BPM | DIASTOLIC BLOOD PRESSURE: 84 MMHG | OXYGEN SATURATION: 96 % | BODY MASS INDEX: 21.79 KG/M2 | HEIGHT: 66 IN | SYSTOLIC BLOOD PRESSURE: 130 MMHG | RESPIRATION RATE: 14 BRPM | TEMPERATURE: 97.1 F

## 2019-01-01 VITALS
RESPIRATION RATE: 16 BRPM | OXYGEN SATURATION: 91 % | BODY MASS INDEX: 21.79 KG/M2 | TEMPERATURE: 97.2 F | HEART RATE: 88 BPM | DIASTOLIC BLOOD PRESSURE: 82 MMHG | SYSTOLIC BLOOD PRESSURE: 97 MMHG | HEIGHT: 66 IN

## 2019-01-01 DIAGNOSIS — E55.9 VITAMIN D DEFICIENCY: ICD-10-CM

## 2019-01-01 DIAGNOSIS — G30.9 ALZHEIMER'S DEMENTIA WITH BEHAVIORAL DISTURBANCE, UNSPECIFIED TIMING OF DEMENTIA ONSET: Primary | ICD-10-CM

## 2019-01-01 DIAGNOSIS — Z86.73 H/O: CVA (CEREBROVASCULAR ACCIDENT): ICD-10-CM

## 2019-01-01 DIAGNOSIS — E78.5 HYPERLIPIDEMIA, UNSPECIFIED HYPERLIPIDEMIA TYPE: ICD-10-CM

## 2019-01-01 DIAGNOSIS — G30.1 LATE ONSET ALZHEIMER'S DISEASE WITH BEHAVIORAL DISTURBANCE (HCC): Primary | ICD-10-CM

## 2019-01-01 DIAGNOSIS — Z91.81 AT HIGH RISK FOR FALLS: ICD-10-CM

## 2019-01-01 DIAGNOSIS — N31.9 NEUROGENIC BLADDER: ICD-10-CM

## 2019-01-01 DIAGNOSIS — I10 ESSENTIAL HYPERTENSION: ICD-10-CM

## 2019-01-01 DIAGNOSIS — Z71.89 ACP (ADVANCE CARE PLANNING): ICD-10-CM

## 2019-01-01 DIAGNOSIS — F02.818 LATE ONSET ALZHEIMER'S DISEASE WITH BEHAVIORAL DISTURBANCE (HCC): ICD-10-CM

## 2019-01-01 DIAGNOSIS — I25.10 ASHD (ARTERIOSCLEROTIC HEART DISEASE): ICD-10-CM

## 2019-01-01 DIAGNOSIS — R13.10 DYSPHAGIA, UNSPECIFIED TYPE: ICD-10-CM

## 2019-01-01 DIAGNOSIS — R35.0 URINE FREQUENCY: Primary | ICD-10-CM

## 2019-01-01 DIAGNOSIS — F02.818 LATE ONSET ALZHEIMER'S DISEASE WITH BEHAVIORAL DISTURBANCE (HCC): Primary | ICD-10-CM

## 2019-01-01 DIAGNOSIS — I73.9 PAD (PERIPHERAL ARTERY DISEASE) (HCC): ICD-10-CM

## 2019-01-01 DIAGNOSIS — G30.1 LATE ONSET ALZHEIMER'S DISEASE WITH BEHAVIORAL DISTURBANCE (HCC): ICD-10-CM

## 2019-01-01 DIAGNOSIS — I48.19 PERSISTENT ATRIAL FIBRILLATION (HCC): ICD-10-CM

## 2019-01-01 DIAGNOSIS — R79.89 ELEVATED LFTS: ICD-10-CM

## 2019-01-01 DIAGNOSIS — R53.81 DEBILITATED PATIENT: ICD-10-CM

## 2019-01-01 DIAGNOSIS — R73.09 ABNORMAL GLUCOSE: ICD-10-CM

## 2019-01-01 DIAGNOSIS — N30.00 ACUTE CYSTITIS WITHOUT HEMATURIA: ICD-10-CM

## 2019-01-01 DIAGNOSIS — R63.8 POOR FLUID INTAKE: ICD-10-CM

## 2019-01-01 DIAGNOSIS — F02.81 ALZHEIMER'S DEMENTIA WITH BEHAVIORAL DISTURBANCE, UNSPECIFIED TIMING OF DEMENTIA ONSET: Primary | ICD-10-CM

## 2019-01-01 DIAGNOSIS — R44.3 HALLUCINATIONS: ICD-10-CM

## 2019-01-01 DIAGNOSIS — Z00.00 MEDICARE ANNUAL WELLNESS VISIT, SUBSEQUENT: ICD-10-CM

## 2019-01-01 LAB
ALBUMIN SERPL-MCNC: 3.5 G/DL (ref 3.4–5)
ALBUMIN/GLOB SERPL: 0.9 {RATIO} (ref 0.8–1.7)
ALP SERPL-CCNC: 100 U/L (ref 45–117)
ALT SERPL-CCNC: 90 U/L (ref 16–61)
ANION GAP SERPL CALC-SCNC: 5 MMOL/L (ref 3–18)
ANION GAP SERPL CALC-SCNC: 8 MMOL/L (ref 3–18)
AST SERPL-CCNC: 66 U/L (ref 15–37)
BACTERIA SPEC CULT: ABNORMAL
BASOPHILS # BLD: 0 K/UL (ref 0–0.1)
BASOPHILS # BLD: 0.1 K/UL (ref 0–0.1)
BASOPHILS NFR BLD: 0 % (ref 0–2)
BASOPHILS NFR BLD: 1 % (ref 0–2)
BILIRUB SERPL-MCNC: 1.7 MG/DL (ref 0.2–1)
BILIRUB UR QL STRIP: NEGATIVE
BUN SERPL-MCNC: 15 MG/DL (ref 7–18)
BUN SERPL-MCNC: 29 MG/DL (ref 7–18)
BUN/CREAT SERPL: 15 (ref 12–20)
BUN/CREAT SERPL: 24 (ref 12–20)
CALCIUM SERPL-MCNC: 9.4 MG/DL (ref 8.5–10.1)
CALCIUM SERPL-MCNC: 9.4 MG/DL (ref 8.5–10.1)
CHLORIDE SERPL-SCNC: 104 MMOL/L (ref 100–108)
CHLORIDE SERPL-SCNC: 117 MMOL/L (ref 100–111)
CHOLEST SERPL-MCNC: 134 MG/DL
CO2 SERPL-SCNC: 28 MMOL/L (ref 21–32)
CO2 SERPL-SCNC: 29 MMOL/L (ref 21–32)
CREAT SERPL-MCNC: 0.97 MG/DL (ref 0.6–1.3)
CREAT SERPL-MCNC: 1.21 MG/DL (ref 0.6–1.3)
DIFFERENTIAL METHOD BLD: ABNORMAL
DIFFERENTIAL METHOD BLD: NORMAL
EOSINOPHIL # BLD: 0.1 K/UL (ref 0–0.4)
EOSINOPHIL # BLD: 0.3 K/UL (ref 0–0.4)
EOSINOPHIL NFR BLD: 1 % (ref 0–5)
EOSINOPHIL NFR BLD: 5 % (ref 0–5)
ERYTHROCYTE [DISTWIDTH] IN BLOOD BY AUTOMATED COUNT: 14.3 % (ref 11.6–14.5)
ERYTHROCYTE [DISTWIDTH] IN BLOOD BY AUTOMATED COUNT: 16 % (ref 11.6–14.5)
GLOBULIN SER CALC-MCNC: 3.7 G/DL (ref 2–4)
GLUCOSE SERPL-MCNC: 102 MG/DL (ref 74–99)
GLUCOSE SERPL-MCNC: 86 MG/DL (ref 74–99)
GLUCOSE UR-MCNC: NEGATIVE MG/DL
HCT VFR BLD AUTO: 46.8 % (ref 36–48)
HCT VFR BLD AUTO: 48.9 % (ref 36–48)
HDLC SERPL-MCNC: 58 MG/DL (ref 40–60)
HDLC SERPL: 2.3 {RATIO} (ref 0–5)
HGB BLD-MCNC: 14.9 G/DL (ref 13–16)
HGB BLD-MCNC: 15.1 G/DL (ref 13–16)
KETONES P FAST UR STRIP-MCNC: NEGATIVE MG/DL
LDLC SERPL CALC-MCNC: 57.2 MG/DL (ref 0–100)
LIPID PROFILE,FLP: NORMAL
LYMPHOCYTES # BLD: 1.1 K/UL (ref 0.9–3.6)
LYMPHOCYTES # BLD: 1.3 K/UL (ref 0.9–3.6)
LYMPHOCYTES NFR BLD: 11 % (ref 21–52)
LYMPHOCYTES NFR BLD: 21 % (ref 21–52)
MCH RBC QN AUTO: 30.3 PG (ref 24–34)
MCH RBC QN AUTO: 30.5 PG (ref 24–34)
MCHC RBC AUTO-ENTMCNC: 30.9 G/DL (ref 31–37)
MCHC RBC AUTO-ENTMCNC: 31.8 G/DL (ref 31–37)
MCV RBC AUTO: 95.7 FL (ref 74–97)
MCV RBC AUTO: 98.2 FL (ref 74–97)
MONOCYTES # BLD: 0.3 K/UL (ref 0.05–1.2)
MONOCYTES # BLD: 0.4 K/UL (ref 0.05–1.2)
MONOCYTES NFR BLD: 4 % (ref 3–10)
MONOCYTES NFR BLD: 6 % (ref 3–10)
NEUTS SEG # BLD: 4.2 K/UL (ref 1.8–8)
NEUTS SEG # BLD: 8.2 K/UL (ref 1.8–8)
NEUTS SEG NFR BLD: 67 % (ref 40–73)
NEUTS SEG NFR BLD: 84 % (ref 40–73)
PH UR STRIP: 5.5 [PH] (ref 4.6–8)
PLATELET # BLD AUTO: 159 K/UL (ref 135–420)
PLATELET # BLD AUTO: 191 K/UL (ref 135–420)
PMV BLD AUTO: 10.9 FL (ref 9.2–11.8)
PMV BLD AUTO: 11.8 FL (ref 9.2–11.8)
POTASSIUM SERPL-SCNC: 3.5 MMOL/L (ref 3.5–5.5)
POTASSIUM SERPL-SCNC: 4.2 MMOL/L (ref 3.5–5.5)
PROT SERPL-MCNC: 7.2 G/DL (ref 6.4–8.2)
PROT UR QL STRIP: NEGATIVE
RBC # BLD AUTO: 4.89 M/UL (ref 4.7–5.5)
RBC # BLD AUTO: 4.98 M/UL (ref 4.7–5.5)
SERVICE CMNT-IMP: ABNORMAL
SODIUM SERPL-SCNC: 140 MMOL/L (ref 136–145)
SODIUM SERPL-SCNC: 151 MMOL/L (ref 136–145)
SP GR UR STRIP: 1.02 (ref 1–1.03)
T4 FREE SERPL-MCNC: 1 NG/DL (ref 0.7–1.5)
TRIGL SERPL-MCNC: 94 MG/DL (ref ?–150)
TSH SERPL DL<=0.05 MIU/L-ACNC: 3.49 UIU/ML (ref 0.36–3.74)
TSH SERPL DL<=0.05 MIU/L-ACNC: 3.8 UIU/ML (ref 0.36–3.74)
UA UROBILINOGEN AMB POC: NORMAL (ref 0.2–1)
URINALYSIS CLARITY POC: NORMAL
URINALYSIS COLOR POC: NORMAL
URINE BLOOD POC: NEGATIVE
URINE LEUKOCYTES POC: NORMAL
URINE NITRITES POC: NEGATIVE
VLDLC SERPL CALC-MCNC: 18.8 MG/DL
WBC # BLD AUTO: 6.1 K/UL (ref 4.6–13.2)
WBC # BLD AUTO: 9.9 K/UL (ref 4.6–13.2)

## 2019-01-01 PROCEDURE — 85025 COMPLETE CBC W/AUTO DIFF WBC: CPT

## 2019-01-01 PROCEDURE — HOSPICE MEDICATION HC HH HOSPICE MEDICATION

## 2019-01-01 PROCEDURE — 0651 HSPC ROUTINE HOME CARE

## 2019-01-01 PROCEDURE — 36415 COLL VENOUS BLD VENIPUNCTURE: CPT

## 2019-01-01 PROCEDURE — 84443 ASSAY THYROID STIM HORMONE: CPT

## 2019-01-01 PROCEDURE — G0156 HHCP-SVS OF AIDE,EA 15 MIN: HCPCS

## 2019-01-01 PROCEDURE — A9270 NON-COVERED ITEM OR SERVICE: HCPCS

## 2019-01-01 PROCEDURE — G0299 HHS/HOSPICE OF RN EA 15 MIN: HCPCS

## 2019-01-01 PROCEDURE — G0155 HHCP-SVS OF CSW,EA 15 MIN: HCPCS

## 2019-01-01 PROCEDURE — 80061 LIPID PANEL: CPT

## 2019-01-01 PROCEDURE — 87186 SC STD MICRODIL/AGAR DIL: CPT

## 2019-01-01 PROCEDURE — T4523 ADULT SIZE BRIEF/DIAPER LG: HCPCS

## 2019-01-01 PROCEDURE — 3331090004 HSPC SERVICE INTENSITY ADD-ON

## 2019-01-01 PROCEDURE — T4541 LARGE DISPOSABLE UNDERPAD: HCPCS

## 2019-01-01 PROCEDURE — 84439 ASSAY OF FREE THYROXINE: CPT

## 2019-01-01 PROCEDURE — A6213 FOAM DRG >16<=48 SQ IN W/BDR: HCPCS

## 2019-01-01 PROCEDURE — A4927 NON-STERILE GLOVES: HCPCS

## 2019-01-01 PROCEDURE — 87077 CULTURE AEROBIC IDENTIFY: CPT

## 2019-01-01 PROCEDURE — A6250 SKIN SEAL PROTECT MOISTURIZR: HCPCS

## 2019-01-01 PROCEDURE — 80048 BASIC METABOLIC PNL TOTAL CA: CPT

## 2019-01-01 PROCEDURE — HHS10554 SHAMPOO/BODY WASH 8 OZ ALOE VESTA

## 2019-01-01 PROCEDURE — 3336500001 HSPC ELECTION

## 2019-01-01 PROCEDURE — 87086 URINE CULTURE/COLONY COUNT: CPT

## 2019-01-01 PROCEDURE — 80053 COMPREHEN METABOLIC PANEL: CPT

## 2019-01-01 RX ORDER — CEFDINIR 250 MG/5ML
POWDER, FOR SUSPENSION ORAL
Qty: 60 ML | Refills: 0 | Status: SHIPPED | OUTPATIENT
Start: 2019-01-01 | End: 2019-01-01

## 2019-01-01 RX ORDER — MEMANTINE HYDROCHLORIDE 5 MG/1
5 TABLET ORAL DAILY
Qty: 90 TAB | Refills: 1 | Status: SHIPPED | OUTPATIENT
Start: 2019-01-01

## 2019-01-01 RX ORDER — DONEPEZIL HYDROCHLORIDE 10 MG/1
10 TABLET, FILM COATED ORAL
Qty: 90 TAB | Refills: 3 | Status: SHIPPED | OUTPATIENT
Start: 2019-01-01 | End: 2019-01-01

## 2019-01-01 RX ORDER — CEFDINIR 300 MG/1
300 CAPSULE ORAL 2 TIMES DAILY
Qty: 20 CAP | Refills: 0 | Status: SHIPPED | OUTPATIENT
Start: 2019-01-01 | End: 2019-01-01 | Stop reason: CLARIF

## 2019-01-30 NOTE — PROGRESS NOTES
Chief Complaint Patient presents with  Dementia  
  6 month follow up with labs. Health Maintenance Due Topic Date Due  Shingrix Vaccine Age 50> (1 of 2) 05/21/1979  Influenza Age 5 to Adult  08/01/2018  GLAUCOMA SCREENING Q2Y  11/28/2018  MEDICARE YEARLY EXAM  12/27/2018 1. Have you been to the ER, urgent care clinic or hospitalized since your last visit? YES. On 12/13/2018 patient went to [facility] emergency room for acute cystitis with hematuria and altered mental status. 2. Have you seen or consulted any other health care providers outside of the [address] [place] [address] [facility] since your last visit (Include any pap smears or colon screening)?  NO

## 2019-01-30 NOTE — ACP (ADVANCE CARE PLANNING)
Advance Care Planning (ACP) Provider Note - Comprehensive Date of ACP Conversation: 01/30/19 Persons included in Conversation:  POA Length of ACP Conversation in minutes:  16 minutes Authorized Decision Maker (if patient is incapable of making informed decisions): daughter This person is: 
Healthcare Agent/Medical Power of  under Advance Directive General ACP for ALL Patients with Decision Making Capacity: 
 Importance of advance care planning, including choosing a healthcare agent to communicate patient's healthcare decisions if patient lost the ability to make decisions, such as after a sudden illness or accident Understanding of the healthcare agent role was assessed and information provided Exploration of values, goals, and preferences if recovery is not expected, even with continued medical treatment in the event of: Imminent death Severe, permanent brain injury Review of Existing Advance Directive: 
Patient with end stage Alzheimer's dementia. Spoke at length with his daughter, Sean Huang, who is his power of . She stated that she would like to complete DNR for the patient, and a Durable DNR form was completed with her and signed. Submitted to be scanned into the chart and daughter provided with copy. For Serious or Chronic Illness: 
Understanding of medical condition Understanding of CPR, goals and expected outcomes, benefits and burdens discussed. Interventions Provided: 
Entered DNR order (If yes, complete Durable DNR form)

## 2019-01-30 NOTE — PATIENT INSTRUCTIONS

## 2019-01-30 NOTE — PROGRESS NOTES
This is the Subsequent Medicare Annual Wellness Exam, performed 12 months or more after the Initial AWV or the last Subsequent AWV I have reviewed the patient's medical history in detail and updated the computerized patient record. History Past Medical History:  
Diagnosis Date  Acute coronary syndrome ([address] 12/26/2011 PCI of LAD at [facility] in [place].  Dementia due to Alzheimer's disease  Hepatitis B   
 about 60 years ago  Hyperlipidemia  Hypertension  Paroxysmal SVT (supraventricular tachycardia) (HCC)  Stroke [facility])   
 carotid artery right side Past Surgical History:  
Procedure Laterality Date  CARDIAC SURG PROCEDURE UNLIST  HX HEENT    
 tonsillectomy Current Outpatient Medications Medication Sig Dispense Refill  atorvastatin (LIPITOR) 40 mg tablet Take 1 Tab by mouth daily. 90 Tab 3  
 dronabinol (MARINOL) 5 mg capsule Take 2 Caps by mouth two (2) times a day. Max Daily Amount: 20 mg. 120 Cap 2  
 memantine (NAMENDA) 5 mg tablet Take 1 Tab by mouth daily. 90 Tab 1  
 ascorbate calcium (VITAMIN C PO) Take  by mouth.  bran/gum/fib/femi/psyl/kelp/pec (FIBER 6 PO) Take 5 mg by mouth.  clopidogrel (PLAVIX) 75 mg tab TAKE 1 TABLET BY MOUTH EVERY DAY 90 Tab 3  
 donepezil (ARICEPT) 10 mg tablet TAKE 1 TAB BY MOUTH NIGHTLY. 90 Tab 3  
 ipratropium (ATROVENT) 0.03 % nasal spray 2 Sprays by Both Nostrils route two (2) times a day. 2-3x/day  cyanocobalamin (VITAMIN B-12) 2,000 mcg TbER Take 3,000 mcg by mouth daily.  MULTIVIT-MIN/FA/LYCOPEN/LUTEIN (CENTRUM SILVER ULTRA MEN'S PO) Take 1 Tab by mouth daily.  B.infantis-B.ani-B.long-B.bifi (PROBIOTIC 4X) 10-15 mg TbEC Take 1 Tab by mouth daily.  co-enzyme Q-10 (CO Q-10) 100 mg capsule Take 200 mg by mouth daily.  Cholecalciferol, Vitamin D3, (VITAMIN D3) 1,000 unit cap Take 1,000 Units by mouth daily.  omega-3 fatty acids-vitamin e (FISH OIL) 1,000 mg Cap Take 1 Cap by mouth daily.  aspirin 81 mg tablet Take 81 mg by mouth daily.  cefdinir (OMNICEF) 300 mg capsule Take 1 Cap by mouth two (2) times a day. 20 Cap 0  
 fluticasone (FLONASE) 50 mcg/actuation nasal spray 2 Sprays by Both Nostrils route daily. 3 Bottle 3  
 DENTA 5000 PLUS 1.1 % crea Take  by mouth two (2) times a day. Indications: DENTAL PLAQUE PREVENTION, GINGIVITIS  ASCORBIC ACID/MULTIVIT-MIN (EMERGEN-C PO) Take 1 Tab by mouth daily.  nitroglycerin (NITROSTAT) 0.4 mg SL tablet 1 tab subling every 5 min for chest pain  Maximum 3 doses for any 1 episode 25 Tab 5 Allergies Allergen Reactions  Latex Other (comments) Daughter is not sure why patient is allergic  Bacitracin Rash  
  blisters  Sulfa (Sulfonamide Antibiotics) Unknown (comments)  Antihistamines - Alkylamine Unknown (comments) Family History Problem Relation Age of Onset  Diabetes Sister  Stroke Sister Social History Tobacco Use  Smoking status: Former Smoker  Smokeless tobacco: Never Used Substance Use Topics  Alcohol use: No  
 
Patient Active Problem List  
Diagnosis Code  Hyperlipidemia E78.5  ASHD (arteriosclerotic heart disease)LAD stent 2011 I25.10  Neurogenic bladder N31.9  Alzheimer's disease G30.9, F02.80  Dysphagia R13.10  Essential hypertension I10  Inguinal hernia unilateral, non-recurrent K40.90  PAD (peripheral artery disease) ([facility]) I73.9  Allergic contact dermatitis L23.9  Atrial fibrillation ([facility]) I48.91  
 Abnormal glucose R73.09  
 H/O: CVA (cerebrovascular accident) Z80.78  
 Debilitated patient R50.80  
 At high risk for falls Z91.81  Elevated LFTs R94.5 Depression Risk Factor Screening: PHQ over the last two weeks 1/30/2019 PHQ Not Done - Little interest or pleasure in doing things Several days Feeling down, depressed, irritable, or hopeless Several days Total Score PHQ 2 2 Alcohol Risk Factor Screening: You do not drink alcohol or very rarely. Functional Ability and Level of Safety:  
Hearing Loss Hearing is decreased. Activities of Daily Living The home contains: handrails, grab bars and hospital bed; wheelchair ramp Patient needs help with:  phone, transportation, shopping, preparing meals, laundry, housework, managing medications, managing money, eating, dressing, bathing, hygiene, bathroom needs and walking Fall Risk Fall Risk Assessment, last 12 mths 1/30/2019 Able to walk? Yes Fall in past 12 months? Yes Fall with injury? Yes  
Number of falls in past 12 months 4 Fall Risk Score 5 Abuse Screen Patient is not abused Cognitive Screening Evaluation of Cognitive Function: 
Has your family/caregiver stated any concerns about your memory: yes Abnormal; Patient with endstage dementia. Patient Care Team  
Patient Care Team: 
Thelbert Mcardle, MD as PCP - General (Internal Medicine) Kamini Crowell DPM (Podiatry) Dangelo Major MD (Gastroenterology) Griselda Filter, MD (Plastic Surgery) Yaa Ayala MD (Neurology) Kirby Lockwood MD (Ophthalmology) Khoi Escobedo MD (Otolaryngology) Rosibel Garcia MD (Cardiology) Assessment/Plan Education and counseling provided: 
Are appropriate based on today's review and evaluation End-of-Life planning (with patient's consent) Influenza Vaccine Cardiovascular screening blood test 
Diabetes screening test 
 
Diagnoses and all orders for this visit: 1. Alzheimer's dementia with behavioral disturbance, unspecified timing of dementia onset 
-     CBC WITH AUTOMATED DIFF; Future -     LIPID PANEL; Future -     METABOLIC PANEL, COMPREHENSIVE; Future 
-     TSH 3RD GENERATION; Future -     VITAMIN D, 25 HYDROXY; Future -     URINALYSIS W/MICROSCOPIC; Future 2. Persistent atrial fibrillation (HCC) 
-     CBC WITH AUTOMATED DIFF; Future -     LIPID PANEL; Future -     METABOLIC PANEL, COMPREHENSIVE; Future 
-     TSH 3RD GENERATION; Future -     VITAMIN D, 25 HYDROXY; Future -     URINALYSIS W/MICROSCOPIC; Future 3. PAD (peripheral artery disease) (HCC) 
-     CBC WITH AUTOMATED DIFF; Future -     LIPID PANEL; Future -     METABOLIC PANEL, COMPREHENSIVE; Future 
-     TSH 3RD GENERATION; Future -     VITAMIN D, 25 HYDROXY; Future -     URINALYSIS W/MICROSCOPIC; Future 4. ASHD (arteriosclerotic heart disease)LAD stent 2011 
-     CBC WITH AUTOMATED DIFF; Future -     LIPID PANEL; Future -     METABOLIC PANEL, COMPREHENSIVE; Future 
-     TSH 3RD GENERATION; Future -     VITAMIN D, 25 HYDROXY; Future -     URINALYSIS W/MICROSCOPIC; Future 5. H/O: CVA (cerebrovascular accident) 
-     CBC WITH AUTOMATED DIFF; Future -     LIPID PANEL; Future -     METABOLIC PANEL, COMPREHENSIVE; Future 
-     TSH 3RD GENERATION; Future -     VITAMIN D, 25 HYDROXY; Future -     URINALYSIS W/MICROSCOPIC; Future 6. Hyperlipidemia, unspecified hyperlipidemia type 
-     CBC WITH AUTOMATED DIFF; Future -     LIPID PANEL; Future -     METABOLIC PANEL, COMPREHENSIVE; Future 
-     TSH 3RD GENERATION; Future -     VITAMIN D, 25 HYDROXY; Future -     URINALYSIS W/MICROSCOPIC; Future 7. Dysphagia, unspecified type 
-     CBC WITH AUTOMATED DIFF; Future -     LIPID PANEL; Future -     METABOLIC PANEL, COMPREHENSIVE; Future 
-     TSH 3RD GENERATION; Future -     VITAMIN D, 25 HYDROXY; Future -     URINALYSIS W/MICROSCOPIC; Future 8. Neurogenic bladder 
-     CBC WITH AUTOMATED DIFF; Future -     LIPID PANEL; Future -     METABOLIC PANEL, COMPREHENSIVE; Future 
-     TSH 3RD GENERATION; Future -     VITAMIN D, 25 HYDROXY; Future -     URINALYSIS W/MICROSCOPIC; Future 9. At high risk for falls 
-     CBC WITH AUTOMATED DIFF; Future -     LIPID PANEL; Future -     METABOLIC PANEL, COMPREHENSIVE; Future 
-     TSH 3RD GENERATION; Future -     VITAMIN D, 25 HYDROXY; Future -     URINALYSIS W/MICROSCOPIC; Future 10. Abnormal glucose 
-     CBC WITH AUTOMATED DIFF; Future -     LIPID PANEL; Future -     METABOLIC PANEL, COMPREHENSIVE; Future 
-     TSH 3RD GENERATION; Future -     VITAMIN D, 25 HYDROXY; Future -     URINALYSIS W/MICROSCOPIC; Future 11. Elevated LFTs 
-     CBC WITH AUTOMATED DIFF; Future -     LIPID PANEL; Future -     METABOLIC PANEL, COMPREHENSIVE; Future 
-     TSH 3RD GENERATION; Future -     VITAMIN D, 25 HYDROXY; Future -     URINALYSIS W/MICROSCOPIC; Future 12. Vitamin D deficiency  
-     VITAMIN D, 25 HYDROXY; Future 13. Medicare annual wellness visit, subsequent 14. ACP (advance care planning) There are no preventive care reminders to display for this patient.

## 2019-02-03 PROBLEM — R79.89 ELEVATED LFTS: Status: ACTIVE | Noted: 2019-01-01

## 2019-02-03 PROBLEM — Z91.81 AT HIGH RISK FOR FALLS: Status: ACTIVE | Noted: 2019-01-01

## 2019-02-03 PROBLEM — R53.81 DEBILITATED PATIENT: Status: ACTIVE | Noted: 2019-01-01

## 2019-02-03 NOTE — PROGRESS NOTES
HPI:  
Kristel Baxter is a 80y.o. year old male who presents today for evaluation of hypertension, hyperlipidemia, dementia due to Alzheimer's disease, ASCVD s/p PCI of LAD 2011 and h/o CVA, and h/o SVT. He is accompanied by his daughter, who is his sole caregiver and provides all of the history. She reports that she has now retired and provides 24 hour care for her father. She states that his memory continues to decline. He is now able to ambulate with a rollator only with assistance and is otherwise wheelchair bound. She states that he has had several falls, mainly by slipping to the floor. His appetite and oral intake remains fair, and he continues to take dronabinol. He continues to sleep well, going to bed at 8 pm and awakening at 11 am. She states that he does appear to be having visual hallucinations, seeing and talking to people who are not present. He continues to require assistance with all of his ADL's. He was evaluated in the ED on 12/13/2018 for decreased po intake and increased hallucinations, and lab evaluation revealed evidence of possible cystitis. He was given IV fluids and treated with Cefdinir for 10 days. She states today that she feels he has returned to his baseline. She does complain that he has watery eyes with crusting of his eyelids in the morning. She denies noticing any discharge or injection, and denies any fever or nasal congestion. She states that he is now sleeping in a hospital bed which has helped with his care. She is otherwise without complaints. He has a history of dementia due to Alzheimer's disease that was first noticed in 1/2012 and progressed with significant worsening in 4/2014.  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 The patient was started on Aricept, with improvement noted in his mood and behavior. In 9/2015, he was noted to have a jerking movement of his head to the right in the morning that lasted 30 seconds. There was no loss of consciousness, but the patient slept for several hours after the event. He was evaluated by Dr. Phylicia Ramirez in 11/2015 for concerns that this may have been seizure activity. Evaluation included: EEG with mild generalized slowing consistent with dementia and no epileptiform activity. ESR/ T. Pallidum FTA-Ab/ folate/ SINDY/ ceruloplasmin/ copper/ TSH/ and B12 were all normal. Repeat MRI (11/2015) revealed a possiblly new subacute infarct in right parietal white matter; otherwise unchanged from 2014. Carotid duplex scan (12/2015) showed mild (< 50%) stenosis bilaterally of the internal carotid arteries. According to the daughter, she was told by Dr. Phylicia Ramirez that the episode from 9/2015 was most likely due to a small stroke. He is currently being treated with Aricept and Namenda. He has a history of hypertension and hyperlipidemia, and in 1/2012, developed an acute MI while driving home to [place] from [place]. He presented with acute coronary syndrome to [facility] (records unavailable) and reportedly underwent PCI and stent placement in the LAD. Left ventricular ejection fraction was reportedly depressed at 30% immediately post MI. He was treated with aspirin and clopidogrel. He reportedly did not have follow-up with Cardiology until 5/2014 when he was referred to see Dr. Jose Cruz Loomis. Repeat echocardiogram in 10/2015 revealed mild concentric LVH with normal LV function (EF 65%) with mild MR and TR. He has remained asymptomatic and his regimen includes aspirin (81 mg), clopidogrel and high intensity dose atorvastatin.  He was previously on carvedilol, ramipril, and hydrochlorothiazide for hypertension, but these were discontinued given episodes of hypotension. In 4/2017, he was found to have new onset rate-controlled atrial fibrillation. The decision was made not to proceed with anti-coagulation given his advanced dementia and fall risk. He was seen in follow-up in 5/2018 and was noted to be in atrial fibrillation with rapid ventricular response with rate of 122 bpm on EKG. He was asymptomatic, and was started on diltiazem  mg daily. However, he did not tolerate it due to hypotension, and it was discontinued. He is being followed by Dr. Lise Deleon. He also has a history of retinal detachment and is being followed by Dr. Magnolia Garcia. Past Medical History:  
Diagnosis Date  Acute coronary syndrome ([address] 12/26/2011 PCI of LAD at [facility] in [place].  Dementia due to Alzheimer's disease  Hepatitis B   
 about 60 years ago  Hyperlipidemia  Hypertension  Paroxysmal SVT (supraventricular tachycardia) (HCC)  Stroke [facility])   
 carotid artery right side Past Surgical History:  
Procedure Laterality Date  CARDIAC SURG PROCEDURE UNLIST  HX HEENT    
 tonsillectomy Current Outpatient Medications Medication Sig  
 atorvastatin (LIPITOR) 40 mg tablet Take 1 Tab by mouth daily.  dronabinol (MARINOL) 5 mg capsule Take 2 Caps by mouth two (2) times a day. Max Daily Amount: 20 mg.  
 memantine (NAMENDA) 5 mg tablet Take 1 Tab by mouth daily.  ascorbate calcium (VITAMIN C PO) Take  by mouth.  bran/gum/fib/femi/psyl/kelp/pec (FIBER 6 PO) Take 5 mg by mouth.  clopidogrel (PLAVIX) 75 mg tab TAKE 1 TABLET BY MOUTH EVERY DAY  donepezil (ARICEPT) 10 mg tablet TAKE 1 TAB BY MOUTH NIGHTLY.  ipratropium (ATROVENT) 0.03 % nasal spray 2 Sprays by Both Nostrils route two (2) times a day. 2-3x/day  cyanocobalamin (VITAMIN B-12) 2,000 mcg TbER Take 3,000 mcg by mouth daily.  MULTIVIT-MIN/FA/LYCOPEN/LUTEIN (CENTRUM SILVER ULTRA MEN'S PO) Take 1 Tab by mouth daily.  B.infantis-B.ani-B.long-B.bifi (PROBIOTIC 4X) 10-15 mg TbEC Take 1 Tab by mouth daily.  co-enzyme Q-10 (CO Q-10) 100 mg capsule Take 200 mg by mouth daily.  Cholecalciferol, Vitamin D3, (VITAMIN D3) 1,000 unit cap Take 1,000 Units by mouth daily.  omega-3 fatty acids-vitamin e (FISH OIL) 1,000 mg Cap Take 1 Cap by mouth daily.  aspirin 81 mg tablet Take 81 mg by mouth daily.  cefdinir (OMNICEF) 300 mg capsule Take 1 Cap by mouth two (2) times a day.  fluticasone (FLONASE) 50 mcg/actuation nasal spray 2 Sprays by Both Nostrils route daily.  DENTA 5000 PLUS 1.1 % crea Take  by mouth two (2) times a day. Indications: DENTAL PLAQUE PREVENTION, GINGIVITIS  ASCORBIC ACID/MULTIVIT-MIN (EMERGEN-C PO) Take 1 Tab by mouth daily.  nitroglycerin (NITROSTAT) 0.4 mg SL tablet 1 tab subling every 5 min for chest pain  Maximum 3 doses for any 1 episode No current facility-administered medications for this visit. Allergies and Intolerances: Allergies Allergen Reactions  Latex Other (comments) Daughter is not sure why patient is allergic  Bacitracin Rash  
  blisters  Sulfa (Sulfonamide Antibiotics) Unknown (comments)  Antihistamines - Alkylamine Unknown (comments) Family History:  
Family History Problem Relation Age of Onset  Diabetes Sister  Stroke Sister Social History: He  reports that he has quit smoking. he has never used smokeless tobacco. He is . He lives with his daughter. He also has two sons. Social History Substance and Sexual Activity Alcohol Use No  
 
Immunization History: 
Immunization History Administered Date(s) Administered  Influenza High Dose Vaccine PF 10/06/2014, 11/10/2015, 09/23/2017, 09/10/2018  Influenza Vaccine Whole 12/30/2009  Pneumococcal Conjugate (PCV-13) 11/10/2015  TD Vaccine 12/16/2003  Tdap 11/10/2015  ZZZ-RETIRED (DO NOT USE) Pneumococcal Vaccine (Unspecified Type) 12/23/2005 Review of Systems: As above included in HPI. Otherwise 11 point review of systems negative including constitutional, skin, HENT, eyes, respiratory, cardiovascular, gastrointestinal, genitourinary, musculoskeletal, endo/heme/aller, neurological. 
 
Physical:  
Vitals:  
BP: 132/82 HR: (!) 104 WT:   
BMI:  22.82 kg/m2 Exam:  
Pt appears frail, calm, and alert. HEENT: PERRLA, no conjunctival injection, mild crusting around eyelashes, no eye discharge noted; anicteric, oropharynx clear, no JVD, adenopathy or thyromegaly. No carotid bruits or radiated murmur. Lungs: clear to auscultation, no wheezes, rhonchi, or rales. Heart: irregular irregular rate and rhythm. No murmur, rubs, gallops Abdomen: soft, nontender, nondistended, normal bowel sounds, no hepatosplenomegaly or masses. Extremities: without edema. Review of Data: 
Labs: Hospital Outpatient Visit on 01/15/2019 Component Date Value Ref Range Status  WBC 01/15/2019 6.1  4.6 - 13.2 K/uL Final  
 RBC 01/15/2019 4.89  4.70 - 5.50 M/uL Final  
 HGB 01/15/2019 14.9  13.0 - 16.0 g/dL Final  
 HCT 01/15/2019 46.8  36.0 - 48.0 % Final  
 MCV 01/15/2019 95.7  74.0 - 97.0 FL Final  
 MCH 01/15/2019 30.5  24.0 - 34.0 PG Final  
 MCHC 01/15/2019 31.8  31.0 - 37.0 g/dL Final  
 RDW 01/15/2019 14.3  11.6 - 14.5 % Final  
 PLATELET 38/54/4450 959  135 - 420 K/uL Final  
 MPV 01/15/2019 10.9  9.2 - 11.8 FL Final  
 NEUTROPHILS 01/15/2019 67  40 - 73 % Final  
 LYMPHOCYTES 01/15/2019 21  21 - 52 % Final  
 MONOCYTES 01/15/2019 6  3 - 10 % Final  
 EOSINOPHILS 01/15/2019 5  0 - 5 % Final  
 BASOPHILS 01/15/2019 1  0 - 2 % Final  
 ABS. NEUTROPHILS 01/15/2019 4.2  1.8 - 8.0 K/UL Final  
 ABS. LYMPHOCYTES 01/15/2019 1.3  0.9 - 3.6 K/UL Final  
 ABS. MONOCYTES 01/15/2019 0.3  0.05 - 1.2 K/UL Final  
 ABS. EOSINOPHILS 01/15/2019 0.3  0.0 - 0.4 K/UL Final  
 ABS.  BASOPHILS 01/15/2019 0.1  0.0 - 0.1 K/UL Final  
  DF 01/15/2019 AUTOMATED    Final  
 LIPID PROFILE 01/15/2019        Final  
 Cholesterol, total 01/15/2019 134  <200 MG/DL Final  
 Triglyceride 01/15/2019 94  <150 MG/DL Final  
 HDL Cholesterol 01/15/2019 58  40 - 60 MG/DL Final  
 LDL, calculated 01/15/2019 57.2  0 - 100 MG/DL Final  
 VLDL, calculated 01/15/2019 18.8  MG/DL Final  
 CHOL/HDL Ratio 01/15/2019 2.3  0 - 5.0   Final  
 Sodium 01/15/2019 140  136 - 145 mmol/L Final  
 Potassium 01/15/2019 4.2  3.5 - 5.5 mmol/L Final  
 Chloride 01/15/2019 104  100 - 108 mmol/L Final  
 CO2 01/15/2019 28  21 - 32 mmol/L Final  
 Anion gap 01/15/2019 8  3.0 - 18 mmol/L Final  
 Glucose 01/15/2019 86  74 - 99 mg/dL Final  
 BUN 01/15/2019 15  7.0 - 18 MG/DL Final  
 Creatinine 01/15/2019 0.97  0.6 - 1.3 MG/DL Final  
 BUN/Creatinine ratio 01/15/2019 15  12 - 20   Final  
 GFR est AA 01/15/2019 >60  >60 ml/min/1.73m2 Final  
 GFR est non-AA 01/15/2019 >60  >60 ml/min/1.73m2 Final  
 Calcium 01/15/2019 9.4  8.5 - 10.1 MG/DL Final  
 Bilirubin, total 01/15/2019 1.7* 0.2 - 1.0 MG/DL Final  
 ALT (SGPT) 01/15/2019 90* 16 - 61 U/L Final  
 AST (SGOT) 01/15/2019 66* 15 - 37 U/L Final  
 Alk. phosphatase 01/15/2019 100  45 - 117 U/L Final  
 Protein, total 01/15/2019 7.2  6.4 - 8.2 g/dL Final  
 Albumin 01/15/2019 3.5  3.4 - 5.0 g/dL Final  
 Globulin 01/15/2019 3.7  2.0 - 4.0 g/dL Final  
 A-G Ratio 01/15/2019 0.9  0.8 - 1.7   Final  
 TSH 01/15/2019 3.80* 0.36 - 3.74 uIU/mL Final  
 
Health Maintenance: 
Screening:  
 Colorectal: colonoscopy (2003) normal. No further screening needed. Depression: none DM (HbA1c/FPG): HbA1c 5.3 (6/2018) Hepatitis C: N/A Falls: No recent falls. Using rollator and wheelchair. Debilitation with unsteady gait. DEXA: N/A 
 PSA/PLACIDO: No further screening needed. Glaucoma: regular eye exams with Dr. Kellie Hu (last 11/2016). Unable to follow-up due to dementia. Smoking: none Vitamin D: 41.9 (6/2018) Medicare Wellness: today Impression: 
Patient Active Problem List  
Diagnosis Code  Hyperlipidemia E78.5  ASHD (arteriosclerotic heart disease)LAD stent 2011 I25.10  Neurogenic bladder N31.9  Alzheimer's disease G30.9, F02.80  Dysphagia R13.10  Essential hypertension I10  Inguinal hernia unilateral, non-recurrent K40.90  PAD (peripheral artery disease) ([facility]) I73.9  Allergic contact dermatitis L23.9  Atrial fibrillation (HCC) I48.91  
 Abnormal glucose R73.09  
 H/O: CVA (cerebrovascular accident) Z80.78  
 Debilitated patient R50.80  
 At high risk for falls Z91.81  Elevated LFTs R94.5 Plan: 1. Hypertension. Blood pressure well controlled without medication. Previously was on carvedilol, ramipril, and hydrochlorothiazide. Renal function remains stable with creatinine 0.97 / eGFR >60 indicative of excellent hydrated status. Encouraged to continue drinking plenty of fluids. Continue to follow. 2. Alzheimer's disease. Slowly progressive. On Aricept and Namenda. Started on Marinol for decrease in appetite, and reports that appetite has now improved and weight appears to have stabilized. Now with 24 hour care. Unable to ambulate without assistance. Multiple falls. Mainly in wheelchair or hospital bed. Fall precautions stressed. Follow. 3. CVA. Prior \"seizure-like\" episode in 2015 felt to be due to small stroke with new subacute infarct in right parietal white matter. Has multiple chronic infarcts and moderate chronic microvascular ischemic changes. On clopidogrel and aspirin. Follow. Discussed concern that with increased fall risk, should consider discontinuing Plavix. Daughter state that she will think about it. 4. ASHD. Currently asymptomatic and stable on current regimen. LV function recovered after initially stunning post-MI. On aspirin, clopidogrel, and statin. Being followed by Dr. Willian Strauss. 5. Atrial fibrillation.  Opting not to proceed with long term anticoagulation ([id number] = 5) given fall risk and advanced dementia. On aspirin and Plavix. No longer on carvedilol given difficulty with hypotension. Did not tolerate trial of diltiazem for rate control due to hypotension. Being followed by Dr. Krishna Hanson. 6. Hyperlipidemia. On high intensity dose atorvastatin with LDL 57, indicative of excellent control. Continue to follow. 7. Pulmonary nodule. Chest x-ray (2/2018) reporting questionable retrocardiac opacity. Review of prior x-rays showed similar report in 12/2017, although not mentioned in 2/2018. Discussed at length with daughter, and decision made to not proceed with chest CT scan to evaluate given severe dementia and debilitation. Repeat chest x-ray in ED did not mention density. Will continue to follow. 8. Abnormal glucose. HbA1c in normal range despite elevated fasting blood sugar. Continue to follow. 9. Elevated LFT's. Patient with elevation of transaminases and total bilirubin. Discussed with daughter, and did not wish to pursue evaluation as clinically stable. Will recheck next visit. 10. Aspiration risk. Daughter reported noting \"gurgling\" after eating. In home speech pathology evaluation performed in 1/2018 and reported safe tolerance for regular consistency diet and thin liquids without obvious clinical signs of aspiration. Now in hospital bed with improvement noted. Continue to follow. 11. Fall risk. Patient not able to ambulate with her walker without assistance, and generally requiring wheelchair. Debilitated status and concern for mobility and falls. 12. Blepharitis. Watery eyes and crusting appear consistent with allergies and probable blepharitis. Discussed conservative measures. Will call if no improvement. 13. Health maintenance. Already recevied influenza vaccine at pharmacy. Will not administer Shingrix vaccine given debilitated status and reactogenicity of the vaccine. Other immunizations up to date.  No further colorectal or prostate screening. Vitamin D level normal. Fall precautions discussed. Unable to continue with regular eye exams given dementia. In addition, an annual Medicare wellness visit was done today. Total time: 40 minutes spent with the patient in face-to-face consultation of which greater than 50% was spent on counseling, answering questions and/or coordination of care. Complex medical review and management performed. Patient understands recommendations and agrees with plan. Follow-up in 6 months.

## 2019-02-15 NOTE — TELEPHONE ENCOUNTER
Last Visit: 1/30/19  Next Appt: 7/30/19  Previous Refill Encounter: 8/23-90+1    Requested Prescriptions     Pending Prescriptions Disp Refills    memantine (NAMENDA) 5 mg tablet 90 Tab 1     Sig: Take 1 Tab by mouth daily.

## 2019-03-28 NOTE — TELEPHONE ENCOUNTER
Daughter called- did AZO test and its postive for UTI- eyes are red and weepy, he was seeing little dogs and talking to invisible people- can you prescribe something?  He has crusty under eyes- she is being treated for eye infection and is on Keflex and Ciproflaxin    Please advise

## 2019-03-28 NOTE — TELEPHONE ENCOUNTER
Needs to be evaluated. At least need urine sample for urinalysis and culture. Unable to prescribe antibiotics without more data. Please inform daughter.

## 2019-03-29 NOTE — TELEPHONE ENCOUNTER
Pts daughter stated she is going to have trouble getting patient to give sample in cup. Coming up mid purple on Azo test. Negative for Nitrites and positive for leukocytes. Advised her to take patient to Urgent Care but she refused because he is hard to get out of house now per patient's daughter. She stated she will try to come by office on Monday to get a specimen cup. Also requesting something for conjunctivitis.

## 2019-03-29 NOTE — TELEPHONE ENCOUNTER
Cannot prescribe anything without patient being seen. Many different causes and treatments for conjunctivitis.

## 2019-03-29 NOTE — TELEPHONE ENCOUNTER
Pt's daughter aware of message and verbalized understanding. will call back and scheduled an appointment.

## 2019-04-02 NOTE — PROGRESS NOTES
Javid Contreras 5/21/1929, is a 80 y.o. male, who is seen today for possible urinary infection and hallucinations and other symptoms. His daughter tells me that he has been seeing things that were not there for the last 2 days other urinary infection. She did a home test and tested positive for leukocytes apparently. He may have some increase in frequency of urination but has significant Alzheimer's disease and that is not obvious. He is not eating quite as well as usual the last few days. He rarely coughs. He has not seemed to have been chills or sweating and he denies any other symptoms. Diagnosed with urinary tract infection in December and treated with RENZO HAYWARD in the emergency room. His daughter is worried that he is allergic to cillins and other antibiotics like she is to be allergic to sulfa. She also notes that first thing in the morning his face and particularly around his eyes seem swollen to her. He thinks this is something new. Past Medical History:  
Diagnosis Date  Acute coronary syndrome ([facility] [address] 12/26/2011 PCI of LAD at [facility] in [place].  Dementia due to Alzheimer's disease  Hepatitis B   
 about 60 years ago  Hyperlipidemia  Hypertension  Paroxysmal SVT (supraventricular tachycardia) (HCC)  Stroke [facility])   
 carotid artery right side Current Outpatient Medications Medication Sig Dispense Refill  cefdinir (OMNICEF) 300 mg capsule Take 1 Cap by mouth two (2) times a day for 10 days. 20 Cap 0  
 memantine (NAMENDA) 5 mg tablet Take 1 Tab by mouth daily. 90 Tab 1  
 atorvastatin (LIPITOR) 40 mg tablet Take 1 Tab by mouth daily. 90 Tab 3  
 dronabinol (MARINOL) 5 mg capsule Take 2 Caps by mouth two (2) times a day. Max Daily Amount: 20 mg. 120 Cap 2  
 fluticasone (FLONASE) 50 mcg/actuation nasal spray 2 Sprays by Both Nostrils route daily. 3 Bottle 3  
 ascorbate calcium (VITAMIN C PO) Take  by mouth.  bran/gum/fib/femi/psyl/kelp/pec (FIBER 6 PO) Take 5 mg by mouth.  clopidogrel (PLAVIX) 75 mg tab TAKE 1 TABLET BY MOUTH EVERY DAY 90 Tab 3  
 donepezil (ARICEPT) 10 mg tablet TAKE 1 TAB BY MOUTH NIGHTLY. 90 Tab 3  
 ipratropium (ATROVENT) 0.03 % nasal spray 2 Sprays by Both Nostrils route two (2) times a day. 2-3x/day  cyanocobalamin (VITAMIN B-12) 2,000 mcg TbER Take 3,000 mcg by mouth daily.  DENTA 5000 PLUS 1.1 % crea Take  by mouth two (2) times a day. Indications: DENTAL PLAQUE PREVENTION, GINGIVITIS  MULTIVIT-MIN/FA/LYCOPEN/LUTEIN (CENTRUM SILVER ULTRA MEN'S PO) Take 1 Tab by mouth daily.  B.infantis-B.ani-B.long-B.bifi (PROBIOTIC 4X) 10-15 mg TbEC Take 1 Tab by mouth daily.  co-enzyme Q-10 (CO Q-10) 100 mg capsule Take 200 mg by mouth daily.  ASCORBIC ACID/MULTIVIT-MIN (EMERGEN-C PO) Take 1 Tab by mouth daily.  nitroglycerin (NITROSTAT) 0.4 mg SL tablet 1 tab subling every 5 min for chest pain  Maximum 3 doses for any 1 episode 25 Tab 5  Cholecalciferol, Vitamin D3, (VITAMIN D3) 1,000 unit cap Take 1,000 Units by mouth daily.  omega-3 fatty acids-vitamin e (FISH OIL) 1,000 mg Cap Take 1 Cap by mouth daily.  aspirin 81 mg tablet Take 81 mg by mouth daily. Visit Vitals /84 (BP 1 Location: Left arm, BP Patient Position: Sitting) Pulse 78 Temp 97.1 °F (36.2 °C) (Oral) Resp 14 Ht 5' 6\" (1.676 m) SpO2 96% BMI 21.79 kg/m² His face and eyes appear normal.  Neck reveals no adenopathy or thyromegaly. Lungs are clear to auscultation with no wheezing or crackles. Heart reveals a regular rhythm with no gallop click or rub. Abdomen is soft and nontender with no hepatosplenomegaly or masses. Remedies reveal no clubbing cyanosis or edema. Results for orders placed or performed in visit on 04/02/19 AMB POC URINALYSIS DIP STICK AUTO W/O MICRO Result Value Ref Range Color (UA POC) Leontine Other Clarity (UA POC) Cloudy Glucose (UA POC) Negative Negative Bilirubin (UA POC) Negative Negative Ketones (UA POC) Negative Negative Specific gravity (UA POC) 1.025 1.001 - 1.035 Blood (UA POC) Negative Negative pH (UA POC) 5.5 4.6 - 8.0 Protein (UA POC) Negative Negative Urobilinogen (UA POC) 0.2 mg/dL 0.2 - 1 Nitrites (UA POC) Negative Negative Leukocyte esterase (UA POC) Trace Negative Assessment: #1.  Hallucinations and possible urinary infection. Significant Alzheimer's and may not have a UTI and/or it may not be causing hallucinations. #2.  Urine does show trace leukocytes so we will go ahead and culture the urine at this point. He started on antibiotic, the same when he used in December which he tolerated well. He will follow-up as needed with his regular primary care physician. Visit lasted 25 minutes, greater than 50% of the time spent counseling other causes for hallucinations besides significant urinary tract infection.

## 2019-04-03 NOTE — TELEPHONE ENCOUNTER
Patient's daughter is calling in regards to the Cefdinir. . Elena Gerard trying to chew the capsule. She called the pharmacy and asked if she could break it open and put it in apple sauce and they advised against it. She is asking for you to prescribe it in liquid form.  Please send to [facility].

## 2019-05-20 NOTE — TELEPHONE ENCOUNTER
Last Visit: 4/2/19 with MD Samy Rivera  Next Appointment: 7/30/19 with MD Levy  Previous Refill Encounter(s): 5/30/18 #90 with 3 refill    Requested Prescriptions     Pending Prescriptions Disp Refills    donepezil (ARICEPT) 10 mg tablet 90 Tab 3     Sig: Take 1 Tab by mouth nightly.

## 2019-07-30 NOTE — PROGRESS NOTES
Chief Complaint   Patient presents with    Dementia     6 month follow up with lab results. Unable to weigh due to patient being unable to stand. Health Maintenance Due   Topic Date Due    Pneumococcal 65+ years (2 of 2 - PPSV23) 11/10/2016     1. Have you been to the ER, urgent care clinic or hospitalized since your last visit? NO.     2. Have you seen or consulted any other health care providers outside of the [address] [place] [address] since your last visit (Include any pap smears or colon screening)? NO      Learning Assessment 7/30/2019   PRIMARY LEARNER Patient   HIGHEST LEVEL OF EDUCATION - PRIMARY LEARNER  SOME COLLEGE   BARRIERS PRIMARY LEARNER HEARING     PHYSICAL   CO-LEARNER CAREGIVER Yes   CO-LEARNER NAME Nadira [address] HIGHEST LEVEL OF EDUCATION > 4 YEARS OF COLLEGE   BARRIERS CO-LEARNER NONE   PRIMARY LANGUAGE ENGLISH   PRIMARY LANGUAGE CO-LEARNER ENGLISH    NEED No   LEARNER PREFERENCE PRIMARY OTHER (COMMENT)     -     -   LEARNER PREFERENCE CO-LEARNER DEMONSTRATION   LEARNING SPECIAL TOPICS no   ANSWERED BY daughter   RELATIONSHIP OTHER       Abuse Screening Questionnaire 7/30/2019   Do you ever feel afraid of your partner? N   Are you in a relationship with someone who physically or mentally threatens you? N   Is it safe for you to go home? Y     3 most recent PHQ Screens 7/30/2019   PHQ Not Done -   Little interest or pleasure in doing things Nearly every day   Feeling down, depressed, irritable, or hopeless Nearly every day   Total Score PHQ 2 6     Fall Risk Assessment, last 12 mths 7/30/2019   Able to walk?  No   Fall in past 12 months? -   Fall with injury? -   Number of falls in past 12 months -   Fall Risk Score -

## 2019-07-30 NOTE — PATIENT INSTRUCTIONS

## 2019-07-31 NOTE — PROGRESS NOTES
Called and spoke with daughter regarding lab results. Advised sodium is very high at 151, and other labs indicative of dehydration. Hypernatremia due to poor po intake. Advised to attempt to increase water intake, but patient continuing to eat and drink little. Referral to hospice in progress for end-stage Alzheimer's dementia.

## 2019-08-02 NOTE — TELEPHONE ENCOUNTER
Ladarius Ledezma, Daughter, calling. Said pt was referred to hospice and they have not received any phone calls regarding. Please contact agency and have them contact her at [phone] or cell [phone].

## 2019-08-02 NOTE — TELEPHONE ENCOUNTER
Called and spoke to Tristan Blair at [facility] and provided the information and he stated that he will pass it on to the lead nurse right away to contact the daughter today.

## 2019-08-03 NOTE — PROGRESS NOTES
HPI:  
Claudell Margo is a 80y.o. year old male who presents today for a physical exam and for evaluation of dementia due to Alzheimer's disease, hypertension, hyperlipidemia, ASCVD s/p PCI of LAD 2011 and h/o CVA, and h/o SVT. He is accompanied by his daughter, who is his sole caregiver and provides all of the history. She reports that she continues to provide 24 hour care for her father. She states that he continues to decline. He is now only able to stand and ambulate very short distances with a rollator before sitting down on the floor. He is otherwise wheelchair bound. She states that he continues to have several falls, which are mainly due to him slipping to the floor after standing. She states that she is now unable to lift him off of the floor and must wait for a family member or friend to help her. She states that his appetite and oral intake are poor, and she is having difficulty encouraging him to eat or drink anything. She has been pushing Boost, Gatorade, and some soft food, but he remains uncooperative and eats and drinks little. She has been monitoring his blood pressure and reports that it has been ranging /65-70. She states that he continues to sleep most of the day, with wakeful periods of only 4-5 hours. She states that he is now sleeping in a hospital bed which has helped with his care. She states that he does appear to be having visual hallucinations, seeing and talking to people who are not present. She states that he is otherwise without complaints. He has a history of dementia due to Alzheimer's disease that was first noticed in 1/2012 and progressed with significant worsening in 4/2014. 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  The patient was started on Aricept, with improvement noted in his mood and behavior. In 9/2015, he was noted to have a jerking movement of his head to the right in the morning that lasted 30 seconds. There was no loss of consciousness, but the patient slept for several hours after the event. He was evaluated by Dr. Michelle Lees in 11/2015 for concerns that this may have been seizure activity. Evaluation included: EEG with mild generalized slowing consistent with dementia and no epileptiform activity. ESR/ T. Pallidum FTA-Ab/ folate/ SINDY/ ceruloplasmin/ copper/ TSH/ and B12 were all normal. Repeat MRI (11/2015) revealed a possiblly new subacute infarct in right parietal white matter; otherwise unchanged from 2014. Carotid duplex scan (12/2015) showed mild (< 50%) stenosis bilaterally of the internal carotid arteries. According to the daughter, she was told by Dr. Michelle Lees that the episode from 9/2015 was most likely due to a small stroke. He is currently being treated with Aricept and Namenda. He has a history of hypertension and hyperlipidemia, and in 1/2012, developed an acute MI while driving home to [place] from [place]. He presented with acute coronary syndrome to [facility] (records unavailable) and reportedly underwent PCI and stent placement in the LAD. Left ventricular ejection fraction was reportedly depressed at 30% immediately post MI. He was treated with aspirin and clopidogrel. He reportedly did not have follow-up with Cardiology until 5/2014 when he was referred to see Dr. Kei Gil. Repeat echocardiogram in 10/2015 revealed mild concentric LVH with normal LV function (EF 65%) with mild MR and TR. He has remained asymptomatic and his regimen includes aspirin (81 mg), clopidogrel and high intensity dose atorvastatin. He was previously on carvedilol, ramipril, and hydrochlorothiazide for hypertension, but these were discontinued given episodes of hypotension.  In 4/2017, he was found to have new onset rate-controlled atrial fibrillation. The decision was made not to proceed with anti-coagulation given his advanced dementia and fall risk. He was seen in follow-up in 5/2018 and was noted to be in atrial fibrillation with rapid ventricular response with rate of 122 bpm on EKG. He was asymptomatic, and was started on diltiazem  mg daily. However, he did not tolerate it due to hypotension, and it was discontinued. He is being followed by Dr. Chastity Montaño. He also has a history of retinal detachment and is being followed by Dr. Kirstin Chang. Past Medical History:  
Diagnosis Date  Acute coronary syndrome ([facility] [address] 12/26/2011 PCI of LAD at [facility] in [address] [facility].  Dementia due to Alzheimer's disease  Hepatitis B   
 about 60 years ago  Hyperlipidemia  Hypertension  Paroxysmal SVT (supraventricular tachycardia) (HCC)  Stroke [facility])   
 carotid artery right side Past Surgical History:  
Procedure Laterality Date  CARDIAC SURG PROCEDURE UNLIST  HX HEENT    
 tonsillectomy Current Outpatient Medications Medication Sig  DAILY MULTI-VITAMIN PO Take  by mouth. Indications: gummy  lactose-reduced food (BOOST PO) Take  by mouth.  donepezil (ARICEPT) 10 mg tablet Take 1 Tab by mouth nightly.  memantine (NAMENDA) 5 mg tablet Take 1 Tab by mouth daily.  atorvastatin (LIPITOR) 40 mg tablet Take 1 Tab by mouth daily.  aspirin 81 mg tablet Take 81 mg by mouth daily. No current facility-administered medications for this visit. Allergies and Intolerances: Allergies Allergen Reactions  Latex Other (comments) Daughter is not sure why patient is allergic  Bacitracin Rash  
  blisters  Sulfa (Sulfonamide Antibiotics) Unknown (comments)  Antihistamines - Alkylamine Unknown (comments) Family History:  
Family History Problem Relation Age of Onset  Diabetes Sister  Stroke Sister Social History: He  reports that he has quit smoking. He has never used smokeless tobacco. He is . He lives with his daughter. He also has two sons. Social History Substance and Sexual Activity Alcohol Use No  
 
Immunization History: 
Immunization History Administered Date(s) Administered  (RETIRED) Pneumococcal Vaccine (Unspecified Type) 12/23/2005  Influenza High Dose Vaccine PF 10/06/2014, 11/10/2015, 09/23/2017, 09/10/2018  Influenza Vaccine Whole 12/30/2009  Pneumococcal Conjugate (PCV-13) 11/10/2015  Pneumococcal Polysaccharide (PPSV-23) 12/23/2005  TD Vaccine 12/16/2003  Tdap 11/10/2015 Review of Systems: As above included in HPI. Otherwise 11 point review of systems negative including constitutional, skin, HENT, eyes, respiratory, cardiovascular, gastrointestinal, genitourinary, musculoskeletal, endo/heme/aller, neurological. 
 
Physical:  
Vitals:  
BP: 97/82 HR: 88 
WT:  unable to obtain BMI:   unable to obtain Exam:  
Pt appears frail, calm, and alert. Appears in no apparent distress. HEENT: PERRLA, anicteric, oropharynx clear, no JVD, adenopathy or thyromegaly. No carotid bruits or radiated murmur. Lungs: clear to auscultation, no wheezes, rhonchi, or rales. Heart: irregular irregular rate and rhythm. No murmur, rubs, gallops Abdomen: soft, nontender, nondistended, normal bowel sounds, no hepatosplenomegaly or masses. Extremities: without edema. Review of Data: 
Labs: 
No visits with results within 1 Month(s) from this visit. Latest known visit with results is:  
Hospital Outpatient Visit on 04/02/2019 Component Date Value Ref Range Status  Special Requests: 04/02/2019 NO SPECIAL REQUESTS    Final  
 Culture result: 04/02/2019 13432 COLONIES/mL PROTEUS MIRABILIS*   Final  
 
Health Maintenance: 
Screening:  
 Colorectal: colonoscopy (2003) normal. No further screening needed. Depression: none DM (HbA1c/FPG): FPG 86 (1/2019) Hepatitis C: N/A Falls: No recent falls. Using rollator and wheelchair. Debilitation with unsteady gait. DEXA: N/A 
 PSA/PLACIDO: No further screening needed. Glaucoma: regular eye exams with Dr. Ophelia Patterson (last 11/2016). Unable to follow-up due to dementia. Smoking: none Vitamin D: 41.9 (6/2018) Medicare Wellness: 1/30/2019 Impression: 
Patient Active Problem List  
Diagnosis Code  Hyperlipidemia E78.5  ASHD (arteriosclerotic heart disease)LAD stent 2011 I25.10  Neurogenic bladder N31.9  Alzheimer's disease G30.9, F02.80  Dysphagia R13.10  Essential hypertension I10  Inguinal hernia unilateral, non-recurrent K40.90  PAD (peripheral artery disease) (HCC) I73.9  Allergic contact dermatitis L23.9  Atrial fibrillation (HCC) I48.91  
 Abnormal glucose R73.09  
 H/O: CVA (cerebrovascular accident) Z80.78  
 Debilitated patient R50.80  
 At high risk for falls Z91.81  Elevated LFTs R94.5 Plan: 1. Hypertension. Blood pressure somewhat low today without medication. Previously was on carvedilol, ramipril, and hydrochlorothiazide. Renal function has been stable with creatinine 0.97 / eGFR >60. Will need to reassess today. Encouraged to continue to attempt to encourage po intake. Continue to follow. 2. Alzheimer's disease. Progressive. On Aricept and Namenda. Started on Marinol for decrease in appetite, but discontinued as patient refusing to take. Intake now poor despite best efforts of daughter who provides 24 hour care. Unable to ambulate without assistance. Multiple falls. Mainly in wheelchair or hospital bed. Sleeping most of the time. Will place referral to hospice given continued deterioration. 3. CVA. Prior \"seizure-like\" episode in 2015 felt to be due to small stroke with new subacute infarct in right parietal white matter.  Has multiple chronic infarcts and moderate chronic microvascular ischemic changes. On aspirin. Daughter discontinued Plavix as recommended due to falls and bleeding risk. 4. ASHD. Currently asymptomatic and stable on current regimen. LV function recovered after initially stunning post-MI. On aspirin and statin. Being followed by Dr. Jose F Wells. 5. Atrial fibrillation. Opting not to proceed with long term anticoagulation ([id number] = 5) given fall risk and advanced dementia. On aspirin. No longer on carvedilol given difficulty with hypotension. Did not tolerate trial of diltiazem for rate control due to hypotension. Being followed by Dr. Jose F Wells. 6. Hyperlipidemia. On high intensity dose atorvastatin with LDL 57 (1/2019), indicative of excellent control. Continue to follow. 7. Pulmonary nodule. Chest x-ray (2/2018) reporting questionable retrocardiac opacity. Review of prior x-rays showed similar report in 12/2017, although not mentioned in 2/2018. Discussed at length with daughter, and decision made to not proceed with chest CT scan to evaluate given severe dementia and debilitation. Repeat chest x-ray in ED did not mention density. Will continue to follow. 8. Abnormal glucose. HbA1c in normal range and fasting blood sugar also now in normal range. Likely reflective of poor po intake and weight loss. Continue to follow. 9. Elevated LFT's. Patient with elevation of transaminases and total bilirubin. Discussed with daughter, and did not wish to pursue evaluation at last visit as was clinically stable. Will recheck today. 10. Aspiration risk. Daughter reported noting \"gurgling\" after eating. In home speech pathology evaluation performed in 1/2018 and reported safe tolerance for regular consistency diet and thin liquids without obvious clinical signs of aspiration. Noted improvement with transition to hospital bed. However, now with poor po intake. 11. Fall risk.  Patient not able to ambulate with her walker without assistance, and generally requiring wheelchair. Debilitated status and concern for mobility and falls. 12. Health maintenance. Will not administer Shingrix vaccine given debilitated status and reactogenicity of the vaccine. Other immunizations up to date. No further colorectal or prostate screening. Vitamin D level normal. Fall precautions discussed. Unable to continue with regular eye exams given dementia. Medicare wellness visit up to date. Total time: 40 minutes spent with the patient in face-to-face consultation of which greater than 50% was spent on counseling, answering questions and/or coordination of care. Complex medical review and management performed. Patient understands recommendations and agrees with plan. Follow-up in 6 months. [facility] Outpatient Visit on 07/30/2019 Component Date Value Ref Range Status  WBC 07/30/2019 9.9  4.6 - 13.2 K/uL Final  
 RBC 07/30/2019 4.98  4.70 - 5.50 M/uL Final  
 HGB 07/30/2019 15.1  13.0 - 16.0 g/dL Final  
 HCT 07/30/2019 48.9* 36.0 - 48.0 % Final  
 MCV 07/30/2019 98.2* 74.0 - 97.0 FL Final  
 MCH 07/30/2019 30.3  24.0 - 34.0 PG Final  
 MCHC 07/30/2019 30.9* 31.0 - 37.0 g/dL Final  
 RDW 07/30/2019 16.0* 11.6 - 14.5 % Final  
 PLATELET 01/34/8300 705  135 - 420 K/uL Final  
 MPV 07/30/2019 11.8  9.2 - 11.8 FL Final  
 NEUTROPHILS 07/30/2019 84* 40 - 73 % Final  
 LYMPHOCYTES 07/30/2019 11* 21 - 52 % Final  
 MONOCYTES 07/30/2019 4  3 - 10 % Final  
 EOSINOPHILS 07/30/2019 1  0 - 5 % Final  
 BASOPHILS 07/30/2019 0  0 - 2 % Final  
 ABS. NEUTROPHILS 07/30/2019 8.2* 1.8 - 8.0 K/UL Final  
 ABS. LYMPHOCYTES 07/30/2019 1.1  0.9 - 3.6 K/UL Final  
 ABS. MONOCYTES 07/30/2019 0.4  0.05 - 1.2 K/UL Final  
 ABS. EOSINOPHILS 07/30/2019 0.1  0.0 - 0.4 K/UL Final  
 ABS.  BASOPHILS 07/30/2019 0.0  0.0 - 0.1 K/UL Final  
 DF 07/30/2019 AUTOMATED    Final  
 Sodium 07/30/2019 151* 136 - 145 mmol/L Final  
  Potassium 07/30/2019 3.5  3.5 - 5.5 mmol/L Final  
 Chloride 07/30/2019 117* 100 - 111 mmol/L Final  
 CO2 07/30/2019 29  21 - 32 mmol/L Final  
 Anion gap 07/30/2019 5  3.0 - 18 mmol/L Final  
 Glucose 07/30/2019 102* 74 - 99 mg/dL Final  
 BUN 07/30/2019 29* 7.0 - 18 MG/DL Final  
 Creatinine 07/30/2019 1.21  0.6 - 1.3 MG/DL Final  
 BUN/Creatinine ratio 07/30/2019 24* 12 - 20   Final  
 GFR est AA 07/30/2019 >60  >60 ml/min/1.73m2 Final  
 GFR est non-AA 07/30/2019 56* >60 ml/min/1.73m2 Final  
 Calcium 07/30/2019 9.4  8.5 - 10.1 MG/DL Final  
 TSH 07/30/2019 3.49  0.36 - 3.74 uIU/mL Final  
 T4, Free 07/30/2019 1.0  0.7 - 1.5 NG/DL Final  
 
Called and spoke with daughter regarding lab results. Advised evidence of dehydration with Na 151, creatinine 1.21/ eGFR 56, and Hb/ Hct increased. Hypernatremia due to poor po intake and poor free water intake. Advised to attempt to increase water intake, but patient continuing to eat and drink little. Referral to hospice in progress for end-stage Alzheimer's dementia.

## 2019-09-23 NOTE — TELEPHONE ENCOUNTER
[facility] Dr Alie Yarbrough that pt passed away today at 12:24pm  If you have any questions please call    [phone]
